# Patient Record
Sex: FEMALE | Race: BLACK OR AFRICAN AMERICAN | HISPANIC OR LATINO | Employment: FULL TIME
[De-identification: names, ages, dates, MRNs, and addresses within clinical notes are randomized per-mention and may not be internally consistent; named-entity substitution may affect disease eponyms.]

---

## 2023-07-19 ENCOUNTER — TRANSCRIBE ORDERS (OUTPATIENT)
Dept: OTHER | Age: 48
End: 2023-07-19

## 2023-07-19 DIAGNOSIS — R19.00 MASS OF PELVIS: Primary | ICD-10-CM

## 2023-07-21 ENCOUNTER — TELEPHONE (OUTPATIENT)
Dept: ORTHOPEDICS | Facility: CLINIC | Age: 48
End: 2023-07-21

## 2023-07-21 NOTE — TELEPHONE ENCOUNTER
Writer spoke with patient to gather more information about her imaging so we could collect it for triaging. She states she had a MRI done 7/17 of her pelvis at [facility] in [place] as well as a CT of chest and abdomen there as well on 7/19. She said she had another MRI done the week prior somewhere in [facility], but could not remember where that was done.    Aydee Umanzor LPN

## 2023-07-24 NOTE — TELEPHONE ENCOUNTER
"Action 2023 9:03 AM MT   Action Taken Sent a [facility] to [facility].  [facility]     Action 2023 8:46 AM MT   Action Taken Image disk states \"out for delivery\".     DIAGNOSIS:   Mass of pelvis [R19.00]  - Primary         APPOINTMENT DATE: 2023   NOTES STATUS DETAILS   OFFICE NOTE from referring provider Care Everywhere    OFFICE NOTE from other specialist Care Everywhere    DISCHARGE REPORT from the ER Care Everywhere    MEDICATION LIST Care Everywhere    MRI PACS  In process: Chris [facility]:  2023 - Pelvis  2023 - L Spine    Chris:  2017 - Pelvis   CT SCAN PACS [facility]:  2023 - Abd/Pel   XRAYS (IMAGES & REPORTS) In process: [facility]:  2023, 2023 - L Spine     "

## 2023-07-25 ENCOUNTER — VIRTUAL VISIT (OUTPATIENT)
Dept: ORTHOPEDICS | Facility: CLINIC | Age: 48
End: 2023-07-25
Payer: COMMERCIAL

## 2023-07-25 ENCOUNTER — PREP FOR PROCEDURE (OUTPATIENT)
Dept: ORTHOPEDICS | Facility: CLINIC | Age: 48
End: 2023-07-25

## 2023-07-25 ENCOUNTER — PRE VISIT (OUTPATIENT)
Dept: ORTHOPEDICS | Facility: CLINIC | Age: 48
End: 2023-07-25

## 2023-07-25 VITALS — HEIGHT: 68 IN | BODY MASS INDEX: 35.46 KG/M2 | WEIGHT: 234 LBS

## 2023-07-25 DIAGNOSIS — R19.00 MASS OF PELVIS: ICD-10-CM

## 2023-07-25 DIAGNOSIS — C79.51 METASTATIC BONE TUMOR (H): Primary | ICD-10-CM

## 2023-07-25 PROCEDURE — 99203 OFFICE O/P NEW LOW 30 MIN: CPT | Mod: VID | Performed by: ORTHOPAEDIC SURGERY

## 2023-07-25 ASSESSMENT — ENCOUNTER SYMPTOMS
DEPRESSION: 0
MEMORY LOSS: 0
DIZZINESS: 0
DISTURBANCES IN COORDINATION: 0
SPEECH CHANGE: 0
NUMBNESS: 1
HEADACHES: 0
LOSS OF CONSCIOUSNESS: 0
SEIZURES: 0
PANIC: 0
INSOMNIA: 1
NERVOUS/ANXIOUS: 1
WEAKNESS: 0
DECREASED CONCENTRATION: 0
TINGLING: 0
PARALYSIS: 0
TREMORS: 0

## 2023-07-25 ASSESSMENT — PAIN SCALES - GENERAL: PAINLEVEL: NO PAIN (0)

## 2023-07-25 NOTE — PATIENT INSTRUCTIONS
Diagnosis: Left pelvic malignancy.  Biopsy needed.  Suspect lymphoma    Plan: 1.  The patient has a walker which she will use.  2.  Miles to schedule the patient to be seen at noon on Thursday if possible to have a Lazaro-Cut biopsy performed in my clinic.  3.  We will tentatively schedule an open biopsy in the week or 2 to come in case the needle biopsy is not diagnostic.

## 2023-07-25 NOTE — LETTER
7/25/2023         RE: Rachael Johnson  [address] [place] [address]        Dear Colleague,    Thank you for referring your patient, Rachael Johnson, to the [facility] [place]. Please see a copy of my visit note below.    Virtual Visit Details      Diagnosis: Left pelvic malignancy.  Biopsy needed.  Suspect lymphoma    Plan: 1.  The patient has a walker which she will use.  2.  Miles to schedule the patient to be seen at noon on Thursday if possible to have a Lazaro-Cut biopsy performed in my clinic.  3.  We will tentatively schedule an open biopsy in the week or 2 to come in case the needle biopsy is not diagnostic.     Patient is a 48-year-old female who based on her reports in the electronic health record is otherwise healthy presents with left buttock pain and left sciatica.  Her evaluation by her primary care team is identified a large tumor in the left posterior ilium and sacrum.  In addition she has adenopathy in the peritoneum and along the left pelvic nodes.  She reports she has had discomfort for 2 months and despite massage and COVID practic treatments the pain is worsened.  She does have trouble walking.    I reviewed her outside MRI and CT scan.  In summary these recapitulate the findings described above.  Importantly the tumor does not involve the acetabulum.  But is highly suspicious for a malignancy perhaps of lymphoma.    I discussed the options of needle biopsy versus open surgery.  After that discussion we agreed to proceed with the needle biopsy this Thursday and an open biopsy to be scheduled if necessary.  All questions were answered.    This was an [facility] video visit.  It began at 1:38 PM and ended at 2:04 PM.  The patient was at her home.  The provider was in his office on the [facility].  The total length of the visit was 26 minutes.          Again, thank you for allowing me to participate in the care of your patient.         Sincerely,        Bethel Estrada MD

## 2023-07-25 NOTE — PROGRESS NOTES
Virtual Visit Details      Diagnosis: Left pelvic malignancy.  Biopsy needed.  Suspect lymphoma    Plan: 1.  The patient has a walker which she will use.  2.  Miles to schedule the patient to be seen at noon on Thursday if possible to have a Lazaro-Cut biopsy performed in my clinic.  3.  We will tentatively schedule an open biopsy in the week or 2 to come in case the needle biopsy is not diagnostic.     Patient is a 48-year-old female who based on her reports in the electronic health record is otherwise healthy presents with left buttock pain and left sciatica.  Her evaluation by her primary care team is identified a large tumor in the left posterior ilium and sacrum.  In addition she has adenopathy in the peritoneum and along the left pelvic nodes.  She reports she has had discomfort for 2 months and despite massage and COVID practic treatments the pain is worsened.  She does have trouble walking.    I reviewed her outside MRI and CT scan.  In summary these recapitulate the findings described above.  Importantly the tumor does not involve the acetabulum.  But is highly suspicious for a malignancy perhaps of lymphoma.    I discussed the options of needle biopsy versus open surgery.  After that discussion we agreed to proceed with the needle biopsy this Thursday and an open biopsy to be scheduled if necessary.  All questions were answered.    This was an [facility] video visit.  It began at 1:38 PM and ended at 2:04 PM.  The patient was at her home.  The provider was in his office on the [facility].  The total length of the visit was 26 minutes.

## 2023-07-25 NOTE — NURSING NOTE
Is the patient currently in the state of [place]? YES    Visit mode:VIDEO    If the visit is dropped, the patient can be reconnected by: VIDEO VISIT: Text to cell phone: [phone]    Will anyone else be joining the visit? NO      How would you like to obtain your AVS? MyChart    Are changes needed to the allergy or medication list? NO    Reason for visit: Consult    Edwige LU

## 2023-07-26 ENCOUNTER — TELEPHONE (OUTPATIENT)
Dept: ORTHOPEDICS | Facility: CLINIC | Age: 48
End: 2023-07-26
Payer: COMMERCIAL

## 2023-07-26 PROBLEM — C79.51 METASTATIC BONE TUMOR (H): Status: ACTIVE | Noted: 2023-07-25

## 2023-07-26 RX ORDER — CEFAZOLIN SODIUM 2 G/50ML
2 SOLUTION INTRAVENOUS
Status: CANCELLED | OUTPATIENT
Start: 2023-07-26

## 2023-07-26 RX ORDER — CEFAZOLIN SODIUM 2 G/50ML
2 SOLUTION INTRAVENOUS SEE ADMIN INSTRUCTIONS
Status: CANCELLED | OUTPATIENT
Start: 2023-07-26

## 2023-07-26 NOTE — TELEPHONE ENCOUNTER
Patient is scheduled for surgery with Dr. Estrada    Spoke with: Rachael    Date of Surgery: 8/11/23    Location: [facility]    Informed patient they will need an adult  Yes    Pre op with Provider PAC, 8/04/23 at 11AM    Additional imaging/appointments: POP Made    Surgery packet: Received     Additional comments: Attentively scheduled, per Dr. Estrada.         Karyn De Luna on 7/26/2023 at 12:32 PM

## 2023-07-26 NOTE — TELEPHONE ENCOUNTER
FUTURE VISIT INFORMATION      SURGERY INFORMATION:  Date: 23  [facility]  Surgeon:  Bethel Estrada MD   Anesthesia Type:  general  Procedure: biopsy left pelvic tumor   Consult: virtual visit 23    RECORDS REQUESTED FROM:       Primary Care Provider: Jens    Most recent EKG+ Tracin22- [facility]

## 2023-07-27 ENCOUNTER — OFFICE VISIT (OUTPATIENT)
Dept: ORTHOPEDICS | Facility: CLINIC | Age: 48
End: 2023-07-27
Payer: COMMERCIAL

## 2023-07-27 ENCOUNTER — LAB (OUTPATIENT)
Dept: LAB | Facility: CLINIC | Age: 48
End: 2023-07-27
Payer: COMMERCIAL

## 2023-07-27 DIAGNOSIS — C79.51 METASTATIC BONE TUMOR (H): ICD-10-CM

## 2023-07-27 DIAGNOSIS — C79.51 METASTATIC BONE TUMOR (H): Primary | ICD-10-CM

## 2023-07-27 DIAGNOSIS — M85.50 ANEURYSMAL BONE CYST: ICD-10-CM

## 2023-07-27 LAB
BASOPHILS # BLD AUTO: 0 10E3/UL (ref 0–0.2)
BASOPHILS NFR BLD AUTO: 1 %
CRP SERPL-MCNC: 14.2 MG/L
EOSINOPHIL # BLD AUTO: 0.1 10E3/UL (ref 0–0.7)
EOSINOPHIL NFR BLD AUTO: 1 %
ERYTHROCYTE [DISTWIDTH] IN BLOOD BY AUTOMATED COUNT: 14.1 % (ref 10–15)
ERYTHROCYTE [SEDIMENTATION RATE] IN BLOOD BY WESTERGREN METHOD: 37 MM/HR (ref 0–30)
HCT VFR BLD AUTO: 38.5 % (ref 35–47)
HGB BLD-MCNC: 12 G/DL (ref 11.7–15.7)
IMM GRANULOCYTES # BLD: 0 10E3/UL
IMM GRANULOCYTES NFR BLD: 0 %
LYMPHOCYTES # BLD AUTO: 1.9 10E3/UL (ref 0.8–5.3)
LYMPHOCYTES NFR BLD AUTO: 30 %
MCH RBC QN AUTO: 28.2 PG (ref 26.5–33)
MCHC RBC AUTO-ENTMCNC: 31.2 G/DL (ref 31.5–36.5)
MCV RBC AUTO: 91 FL (ref 78–100)
MONOCYTES # BLD AUTO: 0.5 10E3/UL (ref 0–1.3)
MONOCYTES NFR BLD AUTO: 7 %
NEUTROPHILS # BLD AUTO: 4 10E3/UL (ref 1.6–8.3)
NEUTROPHILS NFR BLD AUTO: 61 %
NRBC # BLD AUTO: 0 10E3/UL
NRBC BLD AUTO-RTO: 0 /100
PLATELET # BLD AUTO: 387 10E3/UL (ref 150–450)
RBC # BLD AUTO: 4.25 10E6/UL (ref 3.8–5.2)
TOTAL PROTEIN SERUM FOR ELP: 7.3 G/DL (ref 6.4–8.3)
WBC # BLD AUTO: 6.5 10E3/UL (ref 4–11)

## 2023-07-27 PROCEDURE — 99000 SPECIMEN HANDLING OFFICE-LAB: CPT | Performed by: PATHOLOGY

## 2023-07-27 PROCEDURE — 88271 CYTOGENETICS DNA PROBE: CPT | Performed by: ORTHOPAEDIC SURGERY

## 2023-07-27 PROCEDURE — 86140 C-REACTIVE PROTEIN: CPT | Performed by: PATHOLOGY

## 2023-07-27 PROCEDURE — 20206 BIOPSY MUSCLE PERQ NEEDLE: CPT | Mod: LT | Performed by: ORTHOPAEDIC SURGERY

## 2023-07-27 PROCEDURE — 88291 CYTO/MOLECULAR REPORT: CPT | Performed by: MEDICAL GENETICS

## 2023-07-27 PROCEDURE — 36415 COLL VENOUS BLD VENIPUNCTURE: CPT | Performed by: PATHOLOGY

## 2023-07-27 PROCEDURE — 84166 PROTEIN E-PHORESIS/URINE/CSF: CPT | Performed by: PATHOLOGY

## 2023-07-27 PROCEDURE — 88342 IMHCHEM/IMCYTCHM 1ST ANTB: CPT | Mod: 26 | Performed by: STUDENT IN AN ORGANIZED HEALTH CARE EDUCATION/TRAINING PROGRAM

## 2023-07-27 PROCEDURE — 88365 INSITU HYBRIDIZATION (FISH): CPT | Mod: 26 | Performed by: STUDENT IN AN ORGANIZED HEALTH CARE EDUCATION/TRAINING PROGRAM

## 2023-07-27 PROCEDURE — 85025 COMPLETE CBC W/AUTO DIFF WBC: CPT | Performed by: PATHOLOGY

## 2023-07-27 PROCEDURE — 84166 PROTEIN E-PHORESIS/URINE/CSF: CPT | Mod: 26

## 2023-07-27 PROCEDURE — 85652 RBC SED RATE AUTOMATED: CPT | Performed by: PATHOLOGY

## 2023-07-27 PROCEDURE — 83521 IG LIGHT CHAINS FREE EACH: CPT | Performed by: ORTHOPAEDIC SURGERY

## 2023-07-27 PROCEDURE — 84165 PROTEIN E-PHORESIS SERUM: CPT | Mod: TC | Performed by: PATHOLOGY

## 2023-07-27 PROCEDURE — 88341 IMHCHEM/IMCYTCHM EA ADD ANTB: CPT | Mod: 26 | Performed by: STUDENT IN AN ORGANIZED HEALTH CARE EDUCATION/TRAINING PROGRAM

## 2023-07-27 PROCEDURE — 84165 PROTEIN E-PHORESIS SERUM: CPT | Mod: 26

## 2023-07-27 PROCEDURE — 88365 INSITU HYBRIDIZATION (FISH): CPT | Mod: TC | Performed by: ORTHOPAEDIC SURGERY

## 2023-07-27 PROCEDURE — 88307 TISSUE EXAM BY PATHOLOGIST: CPT | Mod: 26 | Performed by: STUDENT IN AN ORGANIZED HEALTH CARE EDUCATION/TRAINING PROGRAM

## 2023-07-27 PROCEDURE — 84155 ASSAY OF PROTEIN SERUM: CPT | Performed by: ORTHOPAEDIC SURGERY

## 2023-07-27 PROCEDURE — 88360 TUMOR IMMUNOHISTOCHEM/MANUAL: CPT | Mod: 26 | Performed by: STUDENT IN AN ORGANIZED HEALTH CARE EDUCATION/TRAINING PROGRAM

## 2023-07-27 NOTE — PROGRESS NOTES
Patient is scheduled for surgical biopsy August 11th pending in clinic biopsy results. Surgical packet and soap received in clinic.    LUIS signed and sent to be scanned into chart.    Tara Holter, RNCC

## 2023-07-27 NOTE — NURSING NOTE
Chief Complaint   Patient presents with    RECHECK     In-clinic biopsy        48 year old  1975       Pain Assessment  Patient Currently in Pain: Yes  0-10 Pain Scale: 1  Primary Pain Location: Buttocks (left)             Data Unavailable        No Known Allergies        No current outpatient medications on file.     No current facility-administered medications for this visit.

## 2023-07-27 NOTE — PATIENT INSTRUCTIONS
Plan: 1.  We will call the patient with the results and set up next steps.  2.  We will order CBC with platelets sed rate CRP and myeloma screening test today.

## 2023-07-27 NOTE — LETTER
7/27/2023         RE: Rachael Johnson  [address] [place] [address]        Dear Colleague,    Thank you for referring your patient, Rachael Johnson, to the [facility] [place]. Please see a copy of my visit note below.        Rachael is seen today in follow-up to her visit earlier this week.  The hope today was to obtain a Lazaro-Cut biopsy of the left iliac and sacral tumor.    Risk and benefits were reviewed.  Consent was signed.    Procedure note: After informed consent and surgical timeout the left iliac and sacral region was prepped.  The skin was infiltrated with lidocaine.  An 11 blade was used to make a small opening in the skin.  3 passes with a Lazaro-Cut needle were performed.  1 did cause some bleeding.  The other 2 generated good samples.  Pressure was applied to the wound and a sterile bandage was applied after the bleeding was stopped.    Plan: 1.  We will call the patient with the results and set up next steps.  2.  We will order CBC with platelets sed rate CRP and myeloma screening test today.    Patient is scheduled for surgical biopsy August 11th pending in clinic biopsy results. Surgical packet and soap received in clinic.    LUIS signed and sent to be scanned into chart.    Tara Holter, RNCC Denis Regis Clohisy, MD

## 2023-07-27 NOTE — NURSING NOTE
[facility] [address] [address]  [phone]  Dept: [phone]  ______________________________________________________________________________    Patient: Rachael Johnson   : 1975   MRN: [id number]   2023    INVASIVE PROCEDURE SAFETY CHECKLIST    Date: 2023   Procedure:Left lower back biopsy   Patient Name: Rachael Johnson  MRN: [id number]  YOB: 1975    Action: Complete sections as appropriate. Any discrepancy results in a HARD COPY until resolved.     PRE PROCEDURE:  Patient ID verified with 2 identifiers (name and  or MRN): Yes  Procedure and site verified with patient/designee (when able): Yes  Accurate consent documentation in medical record: Yes  H&P (or appropriate assessment) documented in medical record: NA  H&P must be up to 20 days prior to procedure and updates within 24 hours of procedure as applicable: NA  Relevant diagnostic and radiology test results appropriately labeled and displayed as applicable: Yes  Procedure site(s) marked with provider initials: NA    TIMEOUT:  Time-Out performed immediately prior to starting procedure, including verbal and active participation of all team members addressing the following:Yes  * Correct patient identify  * Confirmed that the correct side and site are marked  * An accurate procedure consent form  * Agreement on the procedure to be done  * Correct patient position  * Relevant images and results are properly labeled and appropriately displayed  * The need to administer antibiotics or fluids for irrigation purposes during the procedure as applicable   * Safety precautions based on patient history or medication use    DURING PROCEDURE: Verification of correct person, site, and procedures any time the responsibility for care of the patient is transferred to another member of the care team.       The following medications were given:         Prior  to injection, verified patient identity using patient's name and date of birth.  Due to injection administration, patient instructed to remain in clinic for 15 minutes  afterwards, and to report any adverse reaction to me immediately.    Lidocaine Injection for biopsy   Medication Name: Lidocaine NDC 06972-379-64  Drug Amount Wasted:  Yes: 10 mg/ml   Vial/Syringe: Syringe  Expiration Date:  1/1/25  Lot: [id number]        Scribed by Chelsey Victoria ATC for Dr. Estrada on July 27, 2023 at 12:00 pm based on the provider's statements to me.     Chelsey Victorai ATC

## 2023-07-27 NOTE — PROGRESS NOTES
Rachael is seen today in follow-up to her visit earlier this week.  The hope today was to obtain a Lazaro-Cut biopsy of the left iliac and sacral tumor.    Risk and benefits were reviewed.  Consent was signed.    Procedure note: After informed consent and surgical timeout the left iliac and sacral region was prepped.  The skin was infiltrated with lidocaine.  An 11 blade was used to make a small opening in the skin.  3 passes with a Lazaro-Cut needle were performed.  1 did cause some bleeding.  The other 2 generated good samples.  Pressure was applied to the wound and a sterile bandage was applied after the bleeding was stopped.    Plan: 1.  We will call the patient with the results and set up next steps.  2.  We will order CBC with platelets sed rate CRP and myeloma screening test today.

## 2023-07-28 LAB
ALBUMIN SERPL ELPH-MCNC: 3.8 G/DL (ref 3.7–5.1)
ALPHA1 GLOB SERPL ELPH-MCNC: 0.4 G/DL (ref 0.2–0.4)
ALPHA2 GLOB SERPL ELPH-MCNC: 1 G/DL (ref 0.5–0.9)
B-GLOBULIN SERPL ELPH-MCNC: 0.8 G/DL (ref 0.6–1)
GAMMA GLOB SERPL ELPH-MCNC: 1.2 G/DL (ref 0.7–1.6)
KAPPA LC FREE SER-MCNC: 2.53 MG/DL (ref 0.33–1.94)
KAPPA LC FREE/LAMBDA FREE SER NEPH: 0.99 {RATIO} (ref 0.26–1.65)
LAMBDA LC FREE SERPL-MCNC: 2.55 MG/DL (ref 0.57–2.63)
M PROTEIN SERPL ELPH-MCNC: 0 G/DL
PROT PATTERN SERPL ELPH-IMP: ABNORMAL
PROT PATTERN UR ELPH-IMP: NORMAL

## 2023-08-01 ENCOUNTER — TELEPHONE (OUTPATIENT)
Dept: SURGERY | Facility: CLINIC | Age: 48
End: 2023-08-01
Payer: COMMERCIAL

## 2023-08-01 NOTE — TELEPHONE ENCOUNTER
[facility] Call Center    Phone Message    May a detailed message be left on voicemail: yes     Reason for Call: Other: Pt is requesting a call back to discuss questions in regards to their appt. Pt would not specify the questions at hand.     Action Taken: Message routed to:  [facility]    Travel Screening: Not Applicable

## 2023-08-02 ENCOUNTER — PREP FOR PROCEDURE (OUTPATIENT)
Dept: ORTHOPEDICS | Facility: CLINIC | Age: 48
End: 2023-08-02
Payer: COMMERCIAL

## 2023-08-02 NOTE — TELEPHONE ENCOUNTER
Attempted to leave a voice mail but the voice mail box was full.  Will send a my chart message.  Odalys Holt RN

## 2023-08-03 ENCOUNTER — TELEPHONE (OUTPATIENT)
Dept: ORTHOPEDICS | Facility: CLINIC | Age: 48
End: 2023-08-03
Payer: COMMERCIAL

## 2023-08-03 NOTE — CONFIDENTIAL NOTE
Call placed to patient. Per Dr. Estrada tissue obtained from biopsy in clinic biopsy is adequate. Cancel surgery and PAC appointment. Dr. Estrada will call patient tomorrow with results and next steps. Patient verbalized understanding and had no further questions.     Tara Holter, RNCC

## 2023-08-04 ENCOUNTER — PATIENT OUTREACH (OUTPATIENT)
Dept: ONCOLOGY | Facility: CLINIC | Age: 48
End: 2023-08-04

## 2023-08-04 ENCOUNTER — TELEPHONE (OUTPATIENT)
Dept: ORTHOPEDICS | Facility: CLINIC | Age: 48
End: 2023-08-04

## 2023-08-04 ENCOUNTER — PRE VISIT (OUTPATIENT)
Dept: SURGERY | Facility: CLINIC | Age: 48
End: 2023-08-04

## 2023-08-04 DIAGNOSIS — C85.90 LYMPHOMA (H): Primary | ICD-10-CM

## 2023-08-04 NOTE — PROGRESS NOTES
Clohisy, Denis Regis, MD Holter, Tara, RN  I spoke with Dr. Salcedo this morning.    Please:  1. Cancel her surgery.  2. Place a medical oncology consult to treat lymphoma  3. See if med onc wants a PET CT as she has had a body CT already.  Thank you,  Bethel

## 2023-08-04 NOTE — PROGRESS NOTES
[facility]: Cancer Care                                                                   Hem/Onc  Referral reviewed     ASSESSMENT      Clinical History (per Nurse review of records provided):    48 year old female patient with     [address] [place] [address]    Records Location: [facility]   Pertinent labs -- BOOKMARKED  Pertinent pathology report(s) -- BOOKMARKED  Pertinent imaging -- BOOKMARKED  Referring provider note(s)-- BOOKMARKED    INTERVENTION(S)                                                      -OUTGOING CALL to pt:   Introduced my role as nurse navigator with [facility] Hematology/Oncology dept and that we have recd the referral for dx of lymphoma from Dr Oneil.  Identity verified. Pt confirms they are aware of the referral and ready to schedule  Pt is able to do video consult if necessary / recommended  Preferred [facility] Hem/Onc clinic location: [facility]  Might need [facility] depending on time of appt. Lives ~ 3 hrs away from [facility]  Occupation : Massage therapist  From [place]  She will come to appt alone  Barriers to Care Identified if applicable:  Explained to pt I will ask MD to advise re: Lab/PET orders  We discussed that there are many types of lymphoma and in addition to bx a complete work up usually includes lab, imaging, sometimes BMBx in order to characterize the type and stage of lymphoma and guide tx.  Pt voiced understanding of above instructions and information and denied further questions and was   warm-transferred to Oncology Patient Access rep to schedule the consultation.    -Referral Triage Scheduling Instructions added to Hem/Onc  referral for Patient Access rep    PLAN                                                      Hem/Onc consult     Records Needed:     Additional testing needed prior to consult:     See records team's Pre-Visit encounter documentation for additional records retrieval information.    Jie Perry, RN, BSN, OCN  Hematology/Oncology  New Patient Nurse Navigator   [facility]  [phone]

## 2023-08-04 NOTE — CONFIDENTIAL NOTE
Call placed to patient. Confirmed patient had spoke with Dr. Salcedo regarding her diagnosis. Informed her referral for medical oncology placed and someone will be reaching out to schedule an appointment. Patient would like to be seen through [facility]. Briefly discussed Critical access hospital as a resource for future appointments since patient lives in [place]. Patient did not have any further questions.    Tara Holter, RNCC

## 2023-08-04 NOTE — TELEPHONE ENCOUNTER
I spoke with Dr. Salcedo.  We can call the patient to schedule further testing and appointments.  She will need a referral to medical oncology to treat lymphoma.  Her upcoming surgery should be canceled.    We will contact medical oncology team to see if they would like PET/CT in addition to her current and recent CT for their evaluation.    ----- Message from Tara Holter, RN sent at 8/1/2023  2:05 PM CDT -----  Regarding: Biopsy Results  [facility] Dr. Shiela Cano is requesting her results be communicated to her chiropractor, Dr. Gail Salcedo, who will then relay the results to the patient. She signed an LUIS when she was in clinic.     Dr. Gail Salcedo   [phone]

## 2023-08-06 DIAGNOSIS — Z11.59 ENCOUNTER FOR SCREENING FOR VIRAL DISEASE: ICD-10-CM

## 2023-08-06 DIAGNOSIS — C83.398 DIFFUSE LARGE B-CELL LYMPHOMA OF EXTRANODAL SITE EXCLUDING SPLEEN AND OTHER SOLID ORGANS: Primary | ICD-10-CM

## 2023-08-07 NOTE — TELEPHONE ENCOUNTER
RECORDS STATUS - ALL OTHER DIAGNOSIS    Lymphoma (H)   RECORDS RECEIVED FROM:    Appt Date: 8/11/2023 with Dr. Caicedo    NOTES STATUS DETAILS   OFFICE NOTE from referring provider Complete [facility]     Bethel Estr[place] MD      DISCHARGE SUMMARY from hospital     DISCHARGE REPORT from the ER     OPERATIVE REPORT Complete See internal biopsy in [facility]   CLINICAL TRIAL TREATMENTS TO DATE     LABS     PATHOLOGY REPORTS Complete Internal biopsy in [facility]  Soft tissue mass, perisacral, needle core biopsy:  - Diffuse large B-cell lymphoma, germinal center B-cell subtype  - Negative for EBV by in situ hybridization  - See comment   ANYTHING RELATED TO DIAGNOSIS Complete Labs last updated on 8/6/2023   GENONOMIC TESTING     TYPE:     IMAGING (NEED IMAGES & REPORT)     CT SCANS Complete - [facility] images are in PACS CT Chest Abdomen Pelvis 7/20/2023   Xray Chest Complete 11/22/2022 more in PACS   MRI Complete  MRI Pelvis 7/18/2023    MRI Lumbar Spine 7/14/2023    MAMMO     Xray Lumbar Spine Complete 6/22/2023 more in PACS   ULTRASOUND     PET

## 2023-08-09 NOTE — PROGRESS NOTES
[facility]               NEW PATIENT REFERRAL        Aug 11, 2023   Subjective   REFERRAL SOURCE: Bethel Estrada MD    REASON FOR VISIT: New DLBCL    HISTORY OF PRESENT ILLNESS:  Ms. Rachael Johnson is a 48 year old female who presents for consultation regarding newly diagnosed DLBCL.     She originally developed left lower back pain radiated down the left leg with associated numbness/tingling in May 2023. Was managed conservatively with PT and pain medications without improvement. She eventually had a MRI 7/14/23 which revealed a 9 x 10 x 11 cm mass involving left sacrum and left iliac bone and soft tissues, with encasement of left S1 nerve root. CT showed additional soft tissue nodules in left flank and left abdomen and left external iliac LAD. She saw Ortho and biopsy of the soft tissue mass on 7/27/23 shows DLBCL (GCB subtype) with Ki-67 of 70%. FISH negative for MYC/BCL2/BCL6 rearrangements.     Her back pain the past couple of months has been getting worse. Pain is most noticeable when sitting and sleeping and she has had progressive difficulty walking. Sleeping has been tolerable only on her L side and has significantly affected her quality of sleep. Initially was taking a lot of pain medications along with needing toradol shots in the ED but lately she's been trying to manage it with occasional ibuprofen. She has been having a burning sensation, stinging, shooting pain and numbness that travels down the entirety of her left leg. She thinks she feels the mass when sitting. She denies having any fevers or night sweats. She has noticed that she lost 10 lbs in the past month, without any changes in her appetite or diet.        ONCOLOGIC SUMMARY:  Diagnosis:  Diffuse large B-cell lymphoma dx 7/2023, presenting with left back pain and radiculopathy 2/2 large 9 x 10 x 11 cm sacral mass encasing lumbosacral nerve roots. Germinal-center subtype, FISH negative for  "MYC/BCL2/BCL6 rearrangements, Ki67 70%. Stage IV with involvement of bone, pelvic nodules, and lymphadenopathy above/below diaphragm. . R-IPI 3 (LDH, stage, extranodal sites).     Intent of treatment: Curative    Treatment history:  Mick-R-CHP pending    PAST MEDICAL HISTORY:  Ovarian cyst  Reported history of spina bifada    FAMILY HISTORY:  No cancer in family history.  Maternal grandmother passed away at age 33 possibly due to pregnancy/delivery complications    SOCIAL HISTORY:  Never smoker.   Lives in [place], [place] by herself.  after  passed away from kidney failure. Originally from [place]. No family nearby, sister lives in [facility].   Works as a massage therapist.       Allergies   Allergen Reactions    Seasonal Allergies Other (See Comments)     Itchy, watery eyes, runny nose and congestion   Itchy, watery eyes, runny nose and congestion       Current Outpatient Medications:     cholecalciferol (VITAMIN D3) 25 mcg (1000 units) capsule, Take 1,000 Units by mouth, Disp: , Rfl:     loratadine (CLARITIN) 10 MG tablet, Take 10 mg by mouth, Disp: , Rfl:     ZINC METHIONATE PO, Take  by mouth., Disp: , Rfl:     zinc sulfate (ZINCATE) 220 (50 Zn) MG capsule, by mouth., Disp: , Rfl:   No current facility-administered medications for this visit.     REVIEW OF SYSTEMS:  12-point ROS reviewed and negative other than that mentioned in HPI.     Objective   VITAL SIGNS:  BP (!) 163/95 (BP Location: Right arm, Patient Position: Sitting, Cuff Size: Adult Regular)   Pulse 100   Temp 98.4  F (36.9  C) (Oral)   Ht 1.725 m (5' 7.91\")   Wt 102 kg (224 lb 14.4 oz)   SpO2 98%   BMI 34.28 kg/m      ECOG PS: 1     PHYSICAL EXAM:  General: Awake, alert, in no acute distress. Oriented x 3.  HEENT: Normocephalic, atraumatic. No scleral icterus.   Lymph: No cervical, supraclavicular, or axillary lymphadenopathy appreciated.   CV: Regular rate and rhythm. No murmurs, rubs, or gallops appreciated.  Resp: Good " inspiratory effort, lungs clear to auscultation bilaterally.  GI: Abdomen soft, nontender, nondistended.   Back: Pain to palpation along lower left back/flank.   Ext: No peripheral edema bilaterally.  Neuro: CN II-XII grossly intact. 3/5 L leg extension 5/5 R leg extension. Minimal elevation of L leg against gravity.  Skin: No rash, unusual bruising or prominent lesions.  Psych: Pleasant, normal affect.    LABS:  I reviewed the following labs:  Lab Results   Component Value Date    WBC 7.3 08/10/2023    HGB 11.9 08/10/2023    HCT 36.8 08/10/2023    MCV 88 08/10/2023     08/10/2023     Lab Results   Component Value Date     08/10/2023    POTASSIUM 3.5 08/10/2023    CR 0.83 08/10/2023    BUN 16.8 08/10/2023    CHLORIDE 106 08/10/2023    MONTEZ 9.9 08/10/2023    GLC 78 2023      Lab Results   Component Value Date    ALT 11 08/10/2023    AST 23 08/10/2023    ALKPHOS 106 (H) 08/10/2023    BILITOTAL 0.3 08/10/2023      Lab Results   Component Value Date     (H) 08/10/2023    URIC 5.8 (H) 08/10/2023       IMAGIN23 MRI pelvis:  1. Large enhancing mass (9 x 10 x 11 cm) centered about the left iliac bone and left sacrum, consistent with neoplasm. Recommend clinical correlation for metastatic disease as well as CT chest/abdomen/pelvis for further evaluation.   2. Possible peritoneal nodules as well as enlarged left pelvic lymph nodes, suspicious for metastatic disease.     23 PET:  1. Intense hypermetabolic FDG uptake in destructive soft tissue mass  involving the left iliac bone and sacrum (biopsy-proven diffuse large  B-cell lymphoma). The mass extends into the sacral neural foramina.  Deauville score: 5.  2. Multifocal areas of heterogenous hypermetabolic FDG uptake  throughout spine, right iliac bone and right acetabulum, likely  lymphomatous involvement.  3. Intensely hypermetabolic right hilar, left common, external iliac  and bilateral inguinal nodes likely represent  lymphomatous  involvement.  4. Lymphomatous soft tissue implants in the left retroperitoneum and  right paraspinal region at T9 level.    PATHOLOGY:  7/27/23 Sacral mass biopsy:  Soft tissue mass, perisacral, needle core biopsy:  - Diffuse large B-cell lymphoma, germinal center B-cell subtype  - Negative for EBV by in situ hybridization  - See comment    This specimen is consistent with involvement by a large B cell lymphoma. The lymphoma cells are of germinal center B-cell origin according to the Julien algorithm. MYC and BCL2 are negative which denote a non-double expressor status. There is no evidence of a low-grade lymphoma in this small biopsy.     The differential diagnosis includes diffuse large B-cell lymphoma, NOS and large B-cell lymphoma with MYC and BCL2 and/or BCL6 rearrangements. FISH testing for MYC, BCL2 and BCL6 translocations is pending and the results will be reported separately and described in an addendum to this report.    FISH:  RESULTS:     - Gain of IGH (cannot rule out rearrangement) (19.5%)  - No BCL6 rearrangement  - No MYC rearrangement or IGH::MYC fusion  - No BCL2 rearrangement    Assessment & Plan   #Stage IV bulky DLBCL (GCB subtype)   #Back pain/radiculopathy 2/2 large sacral mass  Pleasant 49 yo F with newly diagnosed DLBCL, stage IV with involvement of multiple bone sites including dominant 9 x 10 x 11 cm sacral mass, pelvic nodules, and lymphadenopathy above/below diaphragm. GCB subtype, FISH negative for rearrangements. R-IPI high-intermediate risk (3). She is quite symptomatic with back pain and sciatica due to encasement of lumbosacral nerves by the mass. Will complete additional staging with lumbar puncture to rule out CNS involvement given the peripheral nerve involvement.     We reviewed the overall diagnosis and prognosis of DLBCL. I recommend first-line therapy with polatuzumab-R-CHP which was recently FDA approved based on the Phase 3 international POLARIX trial  (https://www.nejm.org/doi/full/10.1056/SQZYnd7270807). The primary endpoint of progression, relapse, or death was significantly lower in the Mick-R-CHP group than in the R-CHOP group with a hazard ratio of 0.73, and 2-year progression-free survival was 76.7% in the Mick-R-CHP group compared to 70.2% in the standard R-CHOP group. Toxicity profile was also similar between the two regimens. She qualifies with a R-IPI of 3.      We reviewed the logistics and schedule of mick-R-CHP (every 3 weeks, all done as an outpatient in the infusion center). We reviewed potential side effects including possible Rituximab infusion reaction the first cycle, cytopenias/infection, fatigue, GI symptoms, neuropathy, and low chance of anthracycline cardiotoxicity. She is agreeable to proceed. We will plan for a total of 6 cycles with restaging PET-CT every 2 cycles. We also discussed that there may be a role for consolidative radiation given the bulky sacral mass. We will plan to start the first cycle at the [facility] while we look for alternative areas that are closer to her home. If that is not possible, she is agreeable to doing her infusions here. May need [facility] for some stays depending on how early the appointments are. Since it may take a few weeks to get an infusion appt, will start prednisone now to help with pain/symptoms.     #Ppx  - TLS ppx: baseline uric acid 5.8. Start allopurinol 300 mg daily. Encouraged good hydration.  - ID ppx: start  mg BID.      PLAN:  - Start prednisone 100 mg daily x 5 days  - Staging lumbar puncture  - Infusion appt for mick-R-CHP ASAP    Patient was seen with medical student Aj Auguste.     Total of 70 minutes on patient visit, reviewing records, interpreting test results, placing orders, and documentation on the day of service.    Kalpana Caicedo MD  Attending Physician, [facility]

## 2023-08-10 ENCOUNTER — LAB (OUTPATIENT)
Dept: LAB | Facility: CLINIC | Age: 48
End: 2023-08-10
Attending: INTERNAL MEDICINE
Payer: COMMERCIAL

## 2023-08-10 DIAGNOSIS — C83.398 DIFFUSE LARGE B-CELL LYMPHOMA OF EXTRANODAL SITE EXCLUDING SPLEEN AND OTHER SOLID ORGANS: ICD-10-CM

## 2023-08-10 DIAGNOSIS — Z11.59 ENCOUNTER FOR SCREENING FOR VIRAL DISEASE: ICD-10-CM

## 2023-08-10 LAB
ALBUMIN SERPL BCG-MCNC: 4.1 G/DL (ref 3.5–5.2)
ALP SERPL-CCNC: 106 U/L (ref 35–104)
ALT SERPL W P-5'-P-CCNC: 11 U/L (ref 0–50)
ANION GAP SERPL CALCULATED.3IONS-SCNC: 14 MMOL/L (ref 7–15)
AST SERPL W P-5'-P-CCNC: 23 U/L (ref 0–45)
BASOPHILS # BLD AUTO: 0 10E3/UL (ref 0–0.2)
BASOPHILS NFR BLD AUTO: 0 %
BILIRUB SERPL-MCNC: 0.3 MG/DL
BUN SERPL-MCNC: 16.8 MG/DL (ref 6–20)
CALCIUM SERPL-MCNC: 9.9 MG/DL (ref 8.6–10)
CHLORIDE SERPL-SCNC: 106 MMOL/L (ref 98–107)
CREAT SERPL-MCNC: 0.83 MG/DL (ref 0.51–0.95)
DEPRECATED HCO3 PLAS-SCNC: 24 MMOL/L (ref 22–29)
EOSINOPHIL # BLD AUTO: 0.1 10E3/UL (ref 0–0.7)
EOSINOPHIL NFR BLD AUTO: 1 %
ERYTHROCYTE [DISTWIDTH] IN BLOOD BY AUTOMATED COUNT: 13.9 % (ref 10–15)
GFR SERPL CREATININE-BSD FRML MDRD: 85 ML/MIN/1.73M2
GLUCOSE SERPL-MCNC: 78 MG/DL (ref 70–99)
HCT VFR BLD AUTO: 36.8 % (ref 35–47)
HGB BLD-MCNC: 11.9 G/DL (ref 11.7–15.7)
HIV 1+2 AB+HIV1 P24 AG SERPL QL IA: NONREACTIVE
IMM GRANULOCYTES # BLD: 0 10E3/UL
IMM GRANULOCYTES NFR BLD: 0 %
INTERPRETATION: NORMAL
LDH SERPL L TO P-CCNC: 473 U/L (ref 0–250)
LYMPHOCYTES # BLD AUTO: 2 10E3/UL (ref 0.8–5.3)
LYMPHOCYTES NFR BLD AUTO: 27 %
MCH RBC QN AUTO: 28.5 PG (ref 26.5–33)
MCHC RBC AUTO-ENTMCNC: 32.3 G/DL (ref 31.5–36.5)
MCV RBC AUTO: 88 FL (ref 78–100)
MONOCYTES # BLD AUTO: 0.5 10E3/UL (ref 0–1.3)
MONOCYTES NFR BLD AUTO: 6 %
NEUTROPHILS # BLD AUTO: 4.8 10E3/UL (ref 1.6–8.3)
NEUTROPHILS NFR BLD AUTO: 66 %
NRBC # BLD AUTO: 0 10E3/UL
NRBC BLD AUTO-RTO: 0 /100
PHOSPHATE SERPL-MCNC: 3.7 MG/DL (ref 2.5–4.5)
PLATELET # BLD AUTO: 385 10E3/UL (ref 150–450)
POTASSIUM SERPL-SCNC: 3.5 MMOL/L (ref 3.4–5.3)
PROT SERPL-MCNC: 7.4 G/DL (ref 6.4–8.3)
RBC # BLD AUTO: 4.17 10E6/UL (ref 3.8–5.2)
SODIUM SERPL-SCNC: 144 MMOL/L (ref 136–145)
URATE SERPL-MCNC: 5.8 MG/DL (ref 2.4–5.7)
WBC # BLD AUTO: 7.3 10E3/UL (ref 4–11)

## 2023-08-10 PROCEDURE — 36415 COLL VENOUS BLD VENIPUNCTURE: CPT

## 2023-08-10 PROCEDURE — 84100 ASSAY OF PHOSPHORUS: CPT

## 2023-08-10 PROCEDURE — 85025 COMPLETE CBC W/AUTO DIFF WBC: CPT

## 2023-08-10 PROCEDURE — 87340 HEPATITIS B SURFACE AG IA: CPT

## 2023-08-10 PROCEDURE — 84550 ASSAY OF BLOOD/URIC ACID: CPT

## 2023-08-10 PROCEDURE — 80053 COMPREHEN METABOLIC PANEL: CPT

## 2023-08-10 PROCEDURE — 86704 HEP B CORE ANTIBODY TOTAL: CPT

## 2023-08-10 PROCEDURE — 83615 LACTATE (LD) (LDH) ENZYME: CPT

## 2023-08-10 PROCEDURE — 86803 HEPATITIS C AB TEST: CPT

## 2023-08-10 PROCEDURE — 87389 HIV-1 AG W/HIV-1&-2 AB AG IA: CPT

## 2023-08-10 PROCEDURE — 86706 HEP B SURFACE ANTIBODY: CPT

## 2023-08-11 ENCOUNTER — ANCILLARY PROCEDURE (OUTPATIENT)
Dept: PET IMAGING | Facility: CLINIC | Age: 48
End: 2023-08-11
Attending: INTERNAL MEDICINE
Payer: COMMERCIAL

## 2023-08-11 ENCOUNTER — PRE VISIT (OUTPATIENT)
Dept: ONCOLOGY | Facility: CLINIC | Age: 48
End: 2023-08-11
Payer: COMMERCIAL

## 2023-08-11 ENCOUNTER — HOSPITAL ENCOUNTER (OUTPATIENT)
Dept: CARDIOLOGY | Facility: CLINIC | Age: 48
Discharge: HOME | End: 2023-08-11
Attending: INTERNAL MEDICINE
Payer: COMMERCIAL

## 2023-08-11 ENCOUNTER — HOSPITAL ENCOUNTER (OUTPATIENT)
Facility: AMBULATORY SURGERY CENTER | Age: 48
Discharge: HOME | End: 2023-08-11
Attending: ORTHOPAEDIC SURGERY
Payer: COMMERCIAL

## 2023-08-11 ENCOUNTER — ONCOLOGY VISIT (OUTPATIENT)
Dept: ONCOLOGY | Facility: CLINIC | Age: 48
End: 2023-08-11
Attending: ORTHOPAEDIC SURGERY
Payer: COMMERCIAL

## 2023-08-11 ENCOUNTER — PATIENT OUTREACH (OUTPATIENT)
Dept: ONCOLOGY | Facility: CLINIC | Age: 48
End: 2023-08-11

## 2023-08-11 VITALS
HEART RATE: 100 BPM | SYSTOLIC BLOOD PRESSURE: 163 MMHG | HEIGHT: 68 IN | TEMPERATURE: 98.4 F | BODY MASS INDEX: 34.08 KG/M2 | DIASTOLIC BLOOD PRESSURE: 95 MMHG | OXYGEN SATURATION: 98 % | WEIGHT: 224.9 LBS

## 2023-08-11 DIAGNOSIS — C83.398 DIFFUSE LARGE B-CELL LYMPHOMA OF EXTRANODAL SITE: Primary | ICD-10-CM

## 2023-08-11 DIAGNOSIS — C83.398 DIFFUSE LARGE B-CELL LYMPHOMA OF EXTRANODAL SITE EXCLUDING SPLEEN AND OTHER SOLID ORGANS: Primary | ICD-10-CM

## 2023-08-11 DIAGNOSIS — Z76.89 PREVENTION OF CHEMOTHERAPY-INDUCED NEUTROPENIA: ICD-10-CM

## 2023-08-11 DIAGNOSIS — M54.42 LEFT-SIDED LOW BACK PAIN WITH LEFT-SIDED SCIATICA, UNSPECIFIED CHRONICITY: ICD-10-CM

## 2023-08-11 DIAGNOSIS — C79.51 METASTATIC BONE TUMOR (H): Primary | ICD-10-CM

## 2023-08-11 DIAGNOSIS — C83.398 DIFFUSE LARGE B-CELL LYMPHOMA OF EXTRANODAL SITE EXCLUDING SPLEEN AND OTHER SOLID ORGANS: ICD-10-CM

## 2023-08-11 LAB
GLUCOSE SERPL-MCNC: 78 MG/DL (ref 70–99)
HBV CORE AB SERPL QL IA: NONREACTIVE
HBV SURFACE AB SERPL IA-ACNC: 231.86 M[IU]/ML
HBV SURFACE AB SERPL IA-ACNC: REACTIVE M[IU]/ML
HBV SURFACE AG SERPL QL IA: NONREACTIVE
HCV AB SERPL QL IA: NONREACTIVE
LVEF ECHO: NORMAL

## 2023-08-11 PROCEDURE — 99205 OFFICE O/P NEW HI 60 MIN: CPT | Performed by: INTERNAL MEDICINE

## 2023-08-11 PROCEDURE — 93306 TTE W/DOPPLER COMPLETE: CPT

## 2023-08-11 PROCEDURE — 93306 TTE W/DOPPLER COMPLETE: CPT | Mod: 26 | Performed by: INTERNAL MEDICINE

## 2023-08-11 PROCEDURE — G0463 HOSPITAL OUTPT CLINIC VISIT: HCPCS | Mod: 25 | Performed by: INTERNAL MEDICINE

## 2023-08-11 RX ORDER — ZINC SULFATE 50(220)MG
CAPSULE ORAL
COMMUNITY

## 2023-08-11 RX ORDER — PREDNISONE 50 MG/1
100 TABLET ORAL DAILY
Qty: 10 TABLET | Refills: 0 | Status: SHIPPED | OUTPATIENT
Start: 2023-08-11 | End: 2023-08-16

## 2023-08-11 RX ORDER — ACYCLOVIR 400 MG/1
400 TABLET ORAL 2 TIMES DAILY
Qty: 60 TABLET | Refills: 5 | Status: SHIPPED | OUTPATIENT
Start: 2023-08-17

## 2023-08-11 RX ORDER — PROCHLORPERAZINE MALEATE 10 MG
10 TABLET ORAL EVERY 6 HOURS PRN
Qty: 30 TABLET | Refills: 2 | Status: SHIPPED | OUTPATIENT
Start: 2023-08-17

## 2023-08-11 RX ORDER — LORATADINE 10 MG/1
10 TABLET ORAL
COMMUNITY

## 2023-08-11 RX ORDER — ALLOPURINOL 300 MG/1
300 TABLET ORAL DAILY
Qty: 30 TABLET | Refills: 0 | Status: SHIPPED | OUTPATIENT
Start: 2023-08-17 | End: 2023-09-16

## 2023-08-11 RX ORDER — ONDANSETRON 8 MG/1
8 TABLET, FILM COATED ORAL EVERY 8 HOURS PRN
Qty: 30 TABLET | Refills: 2 | Status: SHIPPED | OUTPATIENT
Start: 2023-08-17

## 2023-08-11 ASSESSMENT — PAIN SCALES - GENERAL: PAINLEVEL: MODERATE PAIN (4)

## 2023-08-11 NOTE — LETTER
8/11/2023         RE: Rachael Johnson  [address] [place] [address]        Dear Colleague,    Thank you for referring your patient, Rachael Johnson, to the [facility]. Please see a copy of my visit note below.                                     [facility]               NEW PATIENT REFERRAL        Aug 11, 2023   Subjective  REFERRAL SOURCE: Bethel Estrada MD    REASON FOR VISIT: New DLBCL    HISTORY OF PRESENT ILLNESS:  Ms. Rachael Johnson is a 48 year old female who presents for consultation regarding newly diagnosed DLBCL.     She originally developed left lower back pain radiated down the left leg with associated numbness/tingling in May 2023. Was managed conservatively with PT and pain medications without improvement. She eventually had a MRI 7/14/23 which revealed a 9 x 10 x 11 cm mass involving left sacrum and left iliac bone and soft tissues, with encasement of left S1 nerve root. CT showed additional soft tissue nodules in left flank and left abdomen and left external iliac LAD. She saw Ortho and biopsy of the soft tissue mass on 7/27/23 shows DLBCL (GCB subtype) with Ki-67 of 70%. FISH negative for MYC/BCL2/BCL6 rearrangements.     Her back pain the past couple of months has been getting worse. Pain is most noticeable when sitting and sleeping and she has had progressive difficulty walking. Sleeping has been tolerable only on her L side and has significantly affected her quality of sleep. Initially was taking a lot of pain medications along with needing toradol shots in the ED but lately she's been trying to manage it with occasional ibuprofen. She has been having a burning sensation, stinging, shooting pain and numbness that travels down the entirety of her left leg. She thinks she feels the mass when sitting. She denies having any fevers or night sweats. She has noticed that she lost 10 lbs in the past month, without any changes in her appetite or diet.   "      ONCOLOGIC SUMMARY:  Diagnosis:  Diffuse large B-cell lymphoma dx 7/2023, presenting with left back pain and radiculopathy 2/2 large 9 x 10 x 11 cm sacral mass encasing lumbosacral nerve roots. Germinal-center subtype, FISH negative for MYC/BCL2/BCL6 rearrangements, Ki67 70%. Stage IV with involvement of bone, pelvic nodules, and lymphadenopathy above/below diaphragm. . R-IPI 3 (LDH, stage, extranodal sites).     Intent of treatment: Curative    Treatment history:  Mick-R-CHP pending    PAST MEDICAL HISTORY:  Ovarian cyst  Reported history of spina bifada    FAMILY HISTORY:  No cancer in family history.  Maternal grandmother passed away at age 33 possibly due to pregnancy/delivery complications    SOCIAL HISTORY:  Never smoker.   Lives in [place], [place] by herself.  after  passed away from kidney failure. Originally from [place]. No family nearby, sister lives in [facility].   Works as a massage therapist.       Allergies   Allergen Reactions    Seasonal Allergies Other (See Comments)     Itchy, watery eyes, runny nose and congestion   Itchy, watery eyes, runny nose and congestion       Current Outpatient Medications:     cholecalciferol (VITAMIN D3) 25 mcg (1000 units) capsule, Take 1,000 Units by mouth, Disp: , Rfl:     loratadine (CLARITIN) 10 MG tablet, Take 10 mg by mouth, Disp: , Rfl:     ZINC METHIONATE PO, Take  by mouth., Disp: , Rfl:     zinc sulfate (ZINCATE) 220 (50 Zn) MG capsule, by mouth., Disp: , Rfl:   No current facility-administered medications for this visit.     REVIEW OF SYSTEMS:  12-point ROS reviewed and negative other than that mentioned in HPI.     Objective  VITAL SIGNS:  BP (!) 163/95 (BP Location: Right arm, Patient Position: Sitting, Cuff Size: Adult Regular)   Pulse 100   Temp 98.4  F (36.9  C) (Oral)   Ht 1.725 m (5' 7.91\")   Wt 102 kg (224 lb 14.4 oz)   SpO2 98%   BMI 34.28 kg/m      ECOG PS: 1     PHYSICAL EXAM:  General: Awake, alert, in no acute " distress. Oriented x 3.  HEENT: Normocephalic, atraumatic. No scleral icterus.   Lymph: No cervical, supraclavicular, or axillary lymphadenopathy appreciated.   CV: Regular rate and rhythm. No murmurs, rubs, or gallops appreciated.  Resp: Good inspiratory effort, lungs clear to auscultation bilaterally.  GI: Abdomen soft, nontender, nondistended.   Back: Pain to palpation along lower left back/flank.   Ext: No peripheral edema bilaterally.  Neuro: CN II-XII grossly intact. 3/5 L leg extension 5/5 R leg extension. Minimal elevation of L leg against gravity.  Skin: No rash, unusual bruising or prominent lesions.  Psych: Pleasant, normal affect.    LABS:  I reviewed the following labs:  Lab Results   Component Value Date    WBC 7.3 08/10/2023    HGB 11.9 08/10/2023    HCT 36.8 08/10/2023    MCV 88 08/10/2023     08/10/2023     Lab Results   Component Value Date     08/10/2023    POTASSIUM 3.5 08/10/2023    CR 0.83 08/10/2023    BUN 16.8 08/10/2023    CHLORIDE 106 08/10/2023    MONTEZ 9.9 08/10/2023    GLC 78 2023      Lab Results   Component Value Date    ALT 11 08/10/2023    AST 23 08/10/2023    ALKPHOS 106 (H) 08/10/2023    BILITOTAL 0.3 08/10/2023      Lab Results   Component Value Date     (H) 08/10/2023    URIC 5.8 (H) 08/10/2023       IMAGIN23 MRI pelvis:  1. Large enhancing mass (9 x 10 x 11 cm) centered about the left iliac bone and left sacrum, consistent with neoplasm. Recommend clinical correlation for metastatic disease as well as CT chest/abdomen/pelvis for further evaluation.   2. Possible peritoneal nodules as well as enlarged left pelvic lymph nodes, suspicious for metastatic disease.     23 PET:  1. Intense hypermetabolic FDG uptake in destructive soft tissue mass  involving the left iliac bone and sacrum (biopsy-proven diffuse large  B-cell lymphoma). The mass extends into the sacral neural foramina.  Deauville score: 5.  2. Multifocal areas of heterogenous  hypermetabolic FDG uptake  throughout spine, right iliac bone and right acetabulum, likely  lymphomatous involvement.  3. Intensely hypermetabolic right hilar, left common, external iliac  and bilateral inguinal nodes likely represent lymphomatous  involvement.  4. Lymphomatous soft tissue implants in the left retroperitoneum and  right paraspinal region at T9 level.    PATHOLOGY:  7/27/23 Sacral mass biopsy:  Soft tissue mass, perisacral, needle core biopsy:  - Diffuse large B-cell lymphoma, germinal center B-cell subtype  - Negative for EBV by in situ hybridization  - See comment    This specimen is consistent with involvement by a large B cell lymphoma. The lymphoma cells are of germinal center B-cell origin according to the Julien algorithm. MYC and BCL2 are negative which denote a non-double expressor status. There is no evidence of a low-grade lymphoma in this small biopsy.     The differential diagnosis includes diffuse large B-cell lymphoma, NOS and large B-cell lymphoma with MYC and BCL2 and/or BCL6 rearrangements. FISH testing for MYC, BCL2 and BCL6 translocations is pending and the results will be reported separately and described in an addendum to this report.    FISH:  RESULTS:     - Gain of IGH (cannot rule out rearrangement) (19.5%)  - No BCL6 rearrangement  - No MYC rearrangement or IGH::MYC fusion  - No BCL2 rearrangement    Assessment & Plan  #Stage IV bulky DLBCL (GCB subtype)   #Back pain/radiculopathy 2/2 large sacral mass  Pleasant 49 yo F with newly diagnosed DLBCL, stage IV with involvement of multiple bone sites including dominant 9 x 10 x 11 cm sacral mass, pelvic nodules, and lymphadenopathy above/below diaphragm. GCB subtype, FISH negative for rearrangements. R-IPI high-intermediate risk (3). She is quite symptomatic with back pain and sciatica due to encasement of lumbosacral nerves by the mass. Will complete additional staging with lumbar puncture to rule out CNS involvement given the  peripheral nerve involvement.     We reviewed the overall diagnosis and prognosis of DLBCL. I recommend first-line therapy with polatuzumab-R-CHP which was recently FDA approved based on the Phase 3 international POLARIX trial (https://www.nejm.org/doi/full/10.1056/OTKQjn2989267). The primary endpoint of progression, relapse, or death was significantly lower in the Mick-R-CHP group than in the R-CHOP group with a hazard ratio of 0.73, and 2-year progression-free survival was 76.7% in the Mick-R-CHP group compared to 70.2% in the standard R-CHOP group. Toxicity profile was also similar between the two regimens. She qualifies with a R-IPI of 3.      We reviewed the logistics and schedule of mick-R-CHP (every 3 weeks, all done as an outpatient in the infusion center). We reviewed potential side effects including possible Rituximab infusion reaction the first cycle, cytopenias/infection, fatigue, GI symptoms, neuropathy, and low chance of anthracycline cardiotoxicity. She is agreeable to proceed. We will plan for a total of 6 cycles with restaging PET-CT every 2 cycles. We also discussed that there may be a role for consolidative radiation given the bulky sacral mass. We will plan to start the first cycle at the [facility] while we look for alternative areas that are closer to her home. If that is not possible, she is agreeable to doing her infusions here. [facility] need [facility] for some stays depending on how early the appointments are. Since it may take a few weeks to get an infusion appt, will start prednisone now to help with pain/symptoms.     #Ppx  - TLS ppx: baseline uric acid 5.8. Start allopurinol 300 mg daily. Encouraged good hydration.  - ID ppx: start  mg BID.      PLAN:  - Start prednisone 100 mg daily x 5 days  - Staging lumbar puncture  - Infusion appt for mick-R-CHP ASAP    Patient was seen with medical student Aj Auguste.     Total of 70 minutes on patient visit, reviewing records, interpreting test  results, placing orders, and documentation on the day of service.    Kalpana Caicedo MD  Attending Physician, [facility]

## 2023-08-11 NOTE — NURSING NOTE
"Oncology Rooming Note    August 11, 2023 4:00 PM   Rachael Johnson is a 48 year old female who presents for:    Chief Complaint   Patient presents with    Oncology Clinic Visit     Metastatic bone tumor     Initial Vitals: BP (!) 163/95 (BP Location: Right arm, Patient Position: Sitting, Cuff Size: Adult Regular)   Pulse 100   Temp 98.4  F (36.9  C) (Oral)   Ht 1.725 m (5' 7.91\")   Wt 102 kg (224 lb 14.4 oz)   SpO2 98%   BMI 34.28 kg/m   Estimated body mass index is 34.28 kg/m  as calculated from the following:    Height as of this encounter: 1.725 m (5' 7.91\").    Weight as of this encounter: 102 kg (224 lb 14.4 oz). Body surface area is 2.21 meters squared.  Moderate Pain (4) Comment: Data Unavailable   No LMP recorded. Patient is postmenopausal.  Allergies reviewed: Yes  Medications reviewed: Yes    Medications: Medication refills not needed today.  Pharmacy name entered into EPIC: Data Unavailable    Clinical concerns: none.       Kiran Wong"

## 2023-08-11 NOTE — PROGRESS NOTES
[facility]: Cancer Care Initial Note                                    Discussion with Patient:                                                      Met with Rachael after office visit/consult with Dr. Caicedo. Introduced self as RN Care Coordinator. Went over contact information for [facility]. Discussed roles of RNCC, MD, MAYUR, nurse triage line, and clinic coordinators. Discussed how to get a hold of different team members via [employer] and at [phone]. Authorization to discuss information form signed.   Patient will start chemotherapy within the next few weeks.          Assessment:                                                      Initial  Current living arrangement:: I live alone  Informal Support system:: Friends  Bed or wheelchair confined:: No  Mobility Status: Independent w/Device (uses a cane)  Transportation means:: Regular car  Medication adherence problem (GOAL):: No  Knowledgeable about how to use meds:: Yes  Medication side effects suspected:: No    Plan of Care Education Review:   Assessment completed with:: Patient    Current living arrangement  I live alone    Plan of Care Education   Yearly learning assessment completed?: Yes (see Education tab)  Diagnosis:: DLBCL  Does patient understand diagnosis?: Yes  Tx plan/regimen:: Mick-R-CHP  Does patient understand treatment plan/regimen?: Yes  Preparing for treatment:: Reviewed treatment preparation information with patient (vascular access, day of chemo, visitor policy, what to bring, etc.)  Vascular access:: Peripheral IV  Side effect education:: Diarrhea/Constipation;Fatigue;Hair loss;Infection;Lab value monitoring (anemia, neutropenia, thrombocytopenia);Urinary;Neuropathy;Nausea/Vomiting;Mylosuppression  Transportation means:: Regular car  Safety/self care at home reviewed with patient:: Yes  Informal Support system:: Friends  When to call provider:: Bleeding;Increased shortness of breath;New/worsening pain;Shaking  chills;Temperature >100.4F;Uncontrolled nausea/vomiting;Uncontrolled diarrhea/constipation  Reasons for deferring treatment reviewed with patient:: Yes    Evaluation of Learning  Patient Education Provided: Yes  Readiness:: Acceptance  Method:: Booklet/Handout;Literature;Explanation  Response:: Verbalizes understanding           Intervention/Education provided during outreach:                                                       We reviewed the common side effects, logistics, and when to contact the care team while receiving Mick-R-CHP    Mick-R-CHP expected to start within the next few weeks  Patient to follow up as scheduled at next appt  Patient to call/[employer] message with updates  Confirmed patient has clinic and triage numbers     Patient to follow up as scheduled at next appt    Signature:  Jie Molina RN

## 2023-08-13 ENCOUNTER — HEALTH MAINTENANCE LETTER (OUTPATIENT)
Age: 48
End: 2023-08-13

## 2023-08-15 ENCOUNTER — PATIENT OUTREACH (OUTPATIENT)
Dept: ONCOLOGY | Facility: CLINIC | Age: 48
End: 2023-08-15
Payer: COMMERCIAL

## 2023-08-15 ENCOUNTER — MYC MEDICAL ADVICE (OUTPATIENT)
Dept: ONCOLOGY | Facility: CLINIC | Age: 48
End: 2023-08-15
Payer: COMMERCIAL

## 2023-08-15 DIAGNOSIS — C83.398 DIFFUSE LARGE B-CELL LYMPHOMA OF EXTRANODAL SITE EXCLUDING SPLEEN AND OTHER SOLID ORGANS: Primary | ICD-10-CM

## 2023-08-15 NOTE — PROGRESS NOTES
[facility]: Cancer Care                                                                                          Writer called and spoke with [facility] as Rachael would like to possibly get infusion appointments there as it would be closer to home. Per Marie the , Rachael will just have to make an appointment with Dr. Elizabeth to start receiving treatments at [facility]. Information given to both Rachael and Dr. Caicedo.   [facility] oncology: [phone]    Social work referral sent as Rachael has some financial concerns as she will have to work less often during treatment.     Rachael stated that she started the prednisone but not the allopurinol, she did  the allopurinol from the pharmacy so she has it. Writer reinforced education tumor lysis and informed her to start taking the Allopurinol. Rachael verbalized understanding.   Still having discomfort while sitting and laying down, having difficulty sleeping, and hoping she can get in for an infusion appointment soon to start treatment. Scheduling currently working on appointment request.     Jie Molina, RN, BSN  RN Care Coordinator   [address] [place] [id number] [phone]

## 2023-08-18 ENCOUNTER — PATIENT OUTREACH (OUTPATIENT)
Dept: ONCOLOGY | Facility: CLINIC | Age: 48
End: 2023-08-18
Payer: COMMERCIAL

## 2023-08-18 ENCOUNTER — PATIENT OUTREACH (OUTPATIENT)
Dept: CARE COORDINATION | Facility: CLINIC | Age: 48
End: 2023-08-18
Payer: COMMERCIAL

## 2023-08-18 DIAGNOSIS — Z51.11 ENCOUNTER FOR ANTINEOPLASTIC CHEMOTHERAPY: Primary | ICD-10-CM

## 2023-08-18 LAB
PATH REPORT.ADDENDUM SPEC: ABNORMAL
PATH REPORT.COMMENTS IMP SPEC: ABNORMAL
PATH REPORT.COMMENTS IMP SPEC: YES
PATH REPORT.FINAL DX SPEC: ABNORMAL
PATH REPORT.GROSS SPEC: ABNORMAL
PATH REPORT.MICROSCOPIC SPEC OTHER STN: ABNORMAL
PATH REPORT.RELEVANT HX SPEC: ABNORMAL
PHOTO IMAGE: ABNORMAL

## 2023-08-18 NOTE — PROGRESS NOTES
Social Work - Intervention  [facility]  Data/Intervention:    Patient Name: Rachael Johnson Goes By: Rachael CARRILLOB/Age: 1975 (48 year old)     Visit Type: telephone and MyChart  Referral Source: [facility] [employer]  Reason for Referral: [facility] & Financial Assistance    Collaborated With:    -Patient     Psychosocial Information/Concerns:  SW received referral to connect with patient regarding [facility] stay. Patient may be having phone troubles and was requested to connect via MyChart if not able to connect through phone call.     Intervention/Education/Resources Provided:  SW attempted to reach patient via phone, but was unable to leave . SW sent patient [employer] message introducing self and explaining reason for outreach.    SW and patient were able to connect via phone call and SW provided information on [facility] and financial grants.      Assessment/Plan:  SW will work on financial grants and financial assistance program applications. SW will continue to remain available as needed.  Provided patient/family with contact information and availability.    BETTY Louise,MARCELINO  Hematology/Oncology Social Worker  Phone:[phone] Pager: [phone]

## 2023-08-18 NOTE — PROGRESS NOTES
[facility]: Cancer Care                                                                                          Writer returned phone call to Rachael this morning. Rachael stated that she does not have a  available to bring her to her appointments and first round of chemotherapy next week. Rachael asked about pushing the chemotherapy back a week.   [facility], however she will not have a caregiver with her which is a requirement of [facility]. Message sent to social work.   Rachael will reach out to [facility] in [place] today to set up an appointment with their oncologist, to see if she could start receiving treatment sooner and closer to home.   Message sent to Dr. Caicedo about potential delay in treatment.     Jie Molina, RN, BSN  RN Care Coordinator   [address] [place] [id number] [phone]

## 2023-08-22 NOTE — TELEPHONE ENCOUNTER
Rachael Franco mentioned previously about wanting to talk about pain medications with the provider during the visit she has later this week. I am unsure if she is having more pain where she is needing more medications now or if she is expecting pain when she starts treatment. It wouldn't hurt to assess further.  Never smoker

## 2023-08-22 NOTE — TELEPHONE ENCOUNTER
"Oncology Nurse Triage - Pain  Situation: Rachael reporting the following symptoms: Pain    Background:   Treating Provider:   Dr. Kalpana Caicedo    Date of last office visit: 8/11/23    Recent Treatments: Prednisone 100mg daily x5d completed on 8/16/23; looking to start treatments end of month per notes.     Assessment:     Location: back, abdomen/lymph node  Onset few months for back pain; lymph and abdomen started last week  Duration/Frequency: constant  Rates: 5/10  Quality: pound, sharp  Current med(s) used: completed prednisone burst as prescribed- said this was helpful, shooting pain in foot is less since taking this. Had stopped anything over the counter as did not know if she could take this.   Worsens or relieves with: worse when laying down, walking; using ice packs.  Did have a headache yesterday, contributes to not sleeping well d/t pain.    Denies fevers/chills, chest pain, SOB, n/v, bowel & bladder issues, new or increased bleeding.     Per visit note on 8/11/23, \"Her back pain the past couple of months has been getting worse. Pain is most noticeable when sitting and sleeping and she has had progressive difficulty walking. Sleeping has been tolerable only on her L side and has significantly affected her quality of sleep. Initially was taking a lot of pain medications along with needing toradol shots in the ED but lately she's been trying to manage it with occasional ibuprofen. She has been having a burning sensation, stinging, shooting pain and numbness that travels down the entirety of her left leg. She thinks she feels the mass when sitting. She denies having any fevers or night sweats. She has noticed that she lost 10 lbs in the past month, without any changes in her appetite or diet.\"    Recommendations:   Writer recommended pt resume -600mg q6-8h PRN for pain, continue ice, advised pt call triage line back if symptoms do not improve or worsen, informed will send message to Natalya Rhodes so she is " aware for appt on 8/25. Writer requested pt not wait until upcoming appts to address pain if symptoms worsen. Pt voiced understanding.

## 2023-08-23 NOTE — PROGRESS NOTES
BMT ONC Adult Lumbar Puncture Procedure Note    August 23, 2023    Procedure: Lumbar Puncture to obtain CSF     Diagnosis: diffuse large B cell lymphoma    Learning needs assessment complete within 12 months: YES    Vitals reviewed:  YES    Informed Consent: Procedure, benefits, risks, and alternatives explained to the patient who verbalized understanding of the information and agreed to proceed with lumbar puncture. Risks include bleeding, headache, and or infection.  Patient safety checklist completed.    Labs: Reviewed:    Platelet Count   Date Value Ref Range Status   08/24/2023 296 150 - 450 10e3/uL Final     No results found for: INR     Description:. The patient was seated at the bedside with knees dangling. The L4-5 disc space was prepped and draped in a sterile fashion. Local anesthesia with 3 mL 1% preservative-free lidocaine was used. A 22 gauge, 4 inch needle was placed on the first  attempt.  7 mL spinal fluid collected. Specimen appears light yellow, then clear. Specimen sent for cell count and differential, protein, glucose, and flow cytometry.  The needle was removed and a bandage was applied to the site.  Patient tolerated well.    Post-procedure pain assessment: 0 out of 10 on the numeric pain rating scale from the procedure.  Interventions: No    Complications: No     Disposition: Patient will remain on back for 1 hour post procedure. Avoid soaking in a tub tonight.  Call if develops headaches, fevers and or chills.     Performed by:   Avani Garcia PA-C

## 2023-08-23 NOTE — PROGRESS NOTES
[facility] C[facility]               NEW PATIENT REFERRAL        Aug 25, 2023   Subjective   REFERRAL SOURCE: Bethel Estrada MD    REASON FOR VISIT: New DLBCL    HISTORY OF PRESENT ILLNESS:  Ms. Rachael Johnson is a 48 year old female who presents for consultation regarding newly diagnosed DLBCL.     She originally developed left lower back pain radiated down the left leg with associated numbness/tingling in May 2023. Was managed conservatively with PT and pain medications without improvement. She eventually had a MRI 7/14/23 which revealed a 9 x 10 x 11 cm mass involving left sacrum and left iliac bone and soft tissues, with encasement of left S1 nerve root. CT showed additional soft tissue nodules in left flank and left abdomen and left external iliac LAD. She saw Ortho and biopsy of the soft tissue mass on 7/27/23 shows DLBCL (GCB subtype) with Ki-67 of 70%. FISH negative for MYC/BCL2/BCL6 rearrangements.     She presents to clinic for follow-up prior to C1D1.  She is overall stable.  She continues with the low left back/gluteal pain. Pain is most noticeable when sitting and sleeping and she has had progressive difficulty walking.   She is taking her Oxycodone as needed, been using about once a day or every other day at this time.  She continues with a numbness/tingling that starts in the L glute and radiates down the entire leg into the foot- reports this does impact her walking and she uses a cane.      She reports her appetite has been decreased but she is making sure to eat at least 2 meals per day. She does have some intermittent constipation from the pain medication but she eats prunes to manage.  Denies fevers/chills, SOB/cough, dental pain, abd pain, N/V/D, bleeding issues, neuropathy in upper extremities, new pains, new lumps/bumps, chest pain/palpitations, edema.     ONCOLOGIC SUMMARY:  Diagnosis:  Diffuse large B-cell lymphoma dx 7/2023, presenting with left back pain  and radiculopathy 2/2 large 9 x 10 x 11 cm sacral mass encasing lumbosacral nerve roots. Germinal-center subtype, FISH negative for MYC/BCL2/BCL6 rearrangements, Ki67 70%. Stage IV with involvement of bone, pelvic nodules, and lymphadenopathy above/below diaphragm. . R-IPI 3 (LDH, stage, extranodal sites).     Intent of treatment: Curative    Treatment history:  Mick-R-CHP pending    PAST MEDICAL HISTORY:  Ovarian cyst  Reported history of spina bifada    FAMILY HISTORY:  No cancer in family history.  Maternal grandmother passed away at age 33 possibly due to pregnancy/delivery complications    SOCIAL HISTORY:  Never smoker.   Lives in [place], [place] by herself.  after  passed away from kidney failure. Originally from [place]. No family nearby, sister lives in [place].   Works as a massage therapist.       Allergies   Allergen Reactions    Seasonal Allergies Other (See Comments)     Itchy, watery eyes, runny nose and congestion   Itchy, watery eyes, runny nose and congestion       Current Outpatient Medications:     acyclovir (ZOVIRAX) 400 MG tablet, Take 1 tablet (400 mg) by mouth 2 times daily Viral Prophylaxis. (Patient not taking: Reported on 8/24/2023), Disp: 60 tablet, Rfl: 5    allopurinol (ZYLOPRIM) 300 MG tablet, Take 1 tablet (300 mg) by mouth daily for 30 days (Patient not taking: Reported on 8/24/2023), Disp: 30 tablet, Rfl: 0    cholecalciferol (VITAMIN D3) 25 mcg (1000 units) capsule, Take 1,000 Units by mouth, Disp: , Rfl:     cyclobenzaprine (FLEXERIL) 5 MG tablet, Take 1 tablet (5 mg) by mouth 3 times daily as needed for muscle spasms, Disp: 20 tablet, Rfl: 0    loratadine (CLARITIN) 10 MG tablet, Take 10 mg by mouth, Disp: , Rfl:     ondansetron (ZOFRAN) 8 MG tablet, Take 1 tablet (8 mg) by mouth every 8 hours as needed for nausea (vomiting) (Patient not taking: Reported on 8/24/2023), Disp: 30 tablet, Rfl: 2    oxyCODONE (ROXICODONE) 5 MG tablet, Take 1 tablet (5 mg) by mouth  every 6 hours as needed for moderate to severe pain (Patient not taking: Reported on 8/24/2023), Disp: 30 tablet, Rfl: 0    predniSONE (DELTASONE) 50 MG tablet, Take 1 tablet (50 mg) by mouth 2 times daily for 5 days Take on Days 1 through 5. Take first dose in AM prior to chemotherapy., Disp: 10 tablet, Rfl: 5    prochlorperazine (COMPAZINE) 10 MG tablet, Take 1 tablet (10 mg) by mouth every 6 hours as needed for nausea or vomiting (Patient not taking: Reported on 8/24/2023), Disp: 30 tablet, Rfl: 2    sulfamethoxazole-trimethoprim (BACTRIM DS) 800-160 MG tablet, Take 1 tablet by mouth 2 times daily On Mondays and Tuesdays, Disp: 16 tablet, Rfl: 5    ZINC METHIONATE PO, Take  by mouth., Disp: , Rfl:     zinc sulfate (ZINCATE) 220 (50 Zn) MG capsule, by mouth., Disp: , Rfl:   No current facility-administered medications for this visit.    Facility-Administered Medications Ordered in Other Visits:     cycloPHOSphamide (CYTOXAN) 1,660 mg in sodium chloride 0.9 % 583 mL infusion, 750 mg/m2 (Treatment Plan Recorded), Intravenous, Once, Kalpana Caicedo MD    DOXOrubicin (ADRIAMYCIN) injection 110 mg, 50 mg/m2 (Treatment Plan Recorded), Intravenous Push, Once, Kalpana Caicedo MD    fosaprepitant (EMEND) 150 mg in sodium chloride 0.9 % 275 mL intermittent infusion, 150 mg, Intravenous, Once, Kalpana Caicedo MD, Last Rate: 825 mL/hr at 08/25/23 0852, 150 mg at 08/25/23 0852    pegfilgrastim (NEULASTA Onpro Kit) On-body injector 6 mg, 6 mg, Subcutaneous, Once, Kalpana Caicedo MD    polatuzumab vedotin-piiq (POLIVY) 170 mg in D5W 118.5 mL intermittent infusion, 170 mg, Intravenous, Once, Kalpana Caicedo MD    riTUXimab-abbs (TRUXIMA) 800 mg in sodium chloride 0.9 % 800 mL infusion, 375 mg/m2 (Treatment Plan Recorded), Intravenous, Once, Kalpana Caicedo MD     REVIEW OF SYSTEMS:  12-point ROS reviewed and negative other than that mentioned in HPI.     Objective   VITAL SIGNS:  There were no vitals taken for this visit. (Done in infusion center)    ECOG PS:  1     PHYSICAL EXAM:  General: No acute distress, AO x3  HEENT: Sclera anicteric. Oral exam deferred  Lymph: No lymphadenopathy in neck  Heart: Regular, rate, and rhythm  Lungs: Clear to ascultation bilaterally  Abdomen: Positive bowel sounds.   Extremities: no lower extremity edema  Neuro: Cranial nerves grossly intact  Rash: none on exposed skin     LABS:  I reviewed the following labs:  Lab Results   Component Value Date    WBC 5.7 08/24/2023    HGB 11.1 (L) 08/24/2023    HCT 35.0 08/24/2023    MCV 90 08/24/2023     08/24/2023     Lab Results   Component Value Date     08/10/2023    POTASSIUM 3.5 08/10/2023    CR 0.83 08/10/2023    BUN 16.8 08/10/2023    CHLORIDE 106 08/10/2023    MONTEZ 9.9 08/10/2023    GLC 78 08/11/2023      Lab Results   Component Value Date    ALT 11 08/10/2023    AST 23 08/10/2023    ALKPHOS 106 (H) 08/10/2023    BILITOTAL 0.3 08/10/2023      Lab Results   Component Value Date     (H) 08/10/2023    URIC 5.8 (H) 08/10/2023       Assessment & Plan   #Stage IV bulky DLBCL (GCB subtype)   #Back pain/radiculopathy 2/2 large sacral mass  Pleasant 48 year old female with newly diagnosed DLBCL, stage IV with involvement of multiple bone sites including dominant 9 x 10 x 11 cm sacral mass, pelvic nodules, and lymphadenopathy above/below diaphragm. GCB subtype, FISH negative for rearrangements. R-IPI high-intermediate risk (3). She is quite symptomatic with back pain and sciatica due to encasement of lumbosacral nerves by the mass. 8/24 staging LP- flow  pending    She will receive first-line therapy with polatuzumab-R-CHP which was recently FDA approved based on the Phase 3 international POLARIX trial (https://www.nejm.org/doi/full/10.1056/HUTQqy1351011).    - We will plan for a total of 6 cycles with restaging PET-CT every 2 cycles. She is aware that there may be a role for consolidative radiation given the bulky sacral mass.   - Plan to [facility] the first cycle at the [facility] while we  look for alternative areas that are closer to her home.   - May need [facility] for some stays depending on how early the appointments are.   - Proceed with C1D1 Mick-R-CHP today (8/25)   - Will be due for PET after C2 ~10/4    #Ppx  - TLS ppx: baseline uric acid 5.8. Start allopurinol 300 mg daily. Encouraged good hydration.  -  mg BID.    - Bactrim BID on Mondays and Tuesdays     #L low black and gluteal pain:  Patient reports ongoing low left back/gluteal pain that radiates down the entire leg into the feet.    - Discussed to stop taking ibuprofen and she can take Tylenol if needed  - Oxycodone 5 mg Q6 hours PRN  - Flexeril 5 mg TID PRN     PLAN:  - Proceed with C1D1 today (8/25)  - Virtual visit with Natalya 9/1 for toxicity check  - Patient reports her future visits will be at a local facility- per patient appt scheduled for 9/1    65 minutes spent on the date of the encounter doing chart review, review of test results, interpretation of tests, patient visit, and documentation     AMA Nickerson CNP

## 2023-08-24 ENCOUNTER — APPOINTMENT (OUTPATIENT)
Dept: LAB | Facility: CLINIC | Age: 48
End: 2023-08-24
Attending: INTERNAL MEDICINE
Payer: COMMERCIAL

## 2023-08-24 ENCOUNTER — OFFICE VISIT (OUTPATIENT)
Dept: ONCOLOGY | Facility: CLINIC | Age: 48
End: 2023-08-24
Attending: INTERNAL MEDICINE
Payer: COMMERCIAL

## 2023-08-24 VITALS
RESPIRATION RATE: 18 BRPM | OXYGEN SATURATION: 100 % | TEMPERATURE: 99.2 F | DIASTOLIC BLOOD PRESSURE: 77 MMHG | SYSTOLIC BLOOD PRESSURE: 123 MMHG | HEART RATE: 109 BPM

## 2023-08-24 DIAGNOSIS — C83.398 DIFFUSE LARGE B-CELL LYMPHOMA OF EXTRANODAL SITE EXCLUDING SPLEEN AND OTHER SOLID ORGANS: Primary | ICD-10-CM

## 2023-08-24 DIAGNOSIS — Z51.11 ENCOUNTER FOR ANTINEOPLASTIC CHEMOTHERAPY: ICD-10-CM

## 2023-08-24 DIAGNOSIS — M54.42 LEFT-SIDED LOW BACK PAIN WITH LEFT-SIDED SCIATICA, UNSPECIFIED CHRONICITY: ICD-10-CM

## 2023-08-24 DIAGNOSIS — Z76.89 PREVENTION OF CHEMOTHERAPY-INDUCED NEUTROPENIA: Primary | ICD-10-CM

## 2023-08-24 DIAGNOSIS — C83.398 DIFFUSE LARGE B-CELL LYMPHOMA OF EXTRANODAL SITE EXCLUDING SPLEEN AND OTHER SOLID ORGANS: ICD-10-CM

## 2023-08-24 LAB
BASOPHILS # BLD AUTO: 0 10E3/UL (ref 0–0.2)
BASOPHILS NFR BLD AUTO: 0 %
EOSINOPHIL # BLD AUTO: 0.1 10E3/UL (ref 0–0.7)
EOSINOPHIL NFR BLD AUTO: 2 %
ERYTHROCYTE [DISTWIDTH] IN BLOOD BY AUTOMATED COUNT: 13.9 % (ref 10–15)
GLUCOSE CSF-MCNC: 55 MG/DL (ref 40–70)
HCT VFR BLD AUTO: 35 % (ref 35–47)
HGB BLD-MCNC: 11.1 G/DL (ref 11.7–15.7)
IMM GRANULOCYTES # BLD: 0 10E3/UL
IMM GRANULOCYTES NFR BLD: 0 %
LYMPHOCYTES # BLD AUTO: 1.1 10E3/UL (ref 0.8–5.3)
LYMPHOCYTES NFR BLD AUTO: 19 %
MCH RBC QN AUTO: 28.6 PG (ref 26.5–33)
MCHC RBC AUTO-ENTMCNC: 31.7 G/DL (ref 31.5–36.5)
MCV RBC AUTO: 90 FL (ref 78–100)
MONOCYTES # BLD AUTO: 0.4 10E3/UL (ref 0–1.3)
MONOCYTES NFR BLD AUTO: 7 %
NEUTROPHILS # BLD AUTO: 4.1 10E3/UL (ref 1.6–8.3)
NEUTROPHILS NFR BLD AUTO: 72 %
NRBC # BLD AUTO: 0 10E3/UL
NRBC BLD AUTO-RTO: 0 /100
PLATELET # BLD AUTO: 296 10E3/UL (ref 150–450)
PROT CSF-MCNC: 150 MG/DL (ref 15–45)
RBC # BLD AUTO: 3.88 10E6/UL (ref 3.8–5.2)
WBC # BLD AUTO: 5.7 10E3/UL (ref 4–11)

## 2023-08-24 PROCEDURE — 88184 FLOWCYTOMETRY/ TC 1 MARKER: CPT | Performed by: PHYSICIAN ASSISTANT

## 2023-08-24 PROCEDURE — 84157 ASSAY OF PROTEIN OTHER: CPT | Performed by: PHYSICIAN ASSISTANT

## 2023-08-24 PROCEDURE — 88188 FLOWCYTOMETRY/READ 9-15: CPT | Mod: GC | Performed by: PATHOLOGY

## 2023-08-24 PROCEDURE — 82945 GLUCOSE OTHER FLUID: CPT | Performed by: PHYSICIAN ASSISTANT

## 2023-08-24 PROCEDURE — 89050 BODY FLUID CELL COUNT: CPT | Performed by: PHYSICIAN ASSISTANT

## 2023-08-24 PROCEDURE — 36415 COLL VENOUS BLD VENIPUNCTURE: CPT | Performed by: PHYSICIAN ASSISTANT

## 2023-08-24 PROCEDURE — 62270 DX LMBR SPI PNXR: CPT | Performed by: PHYSICIAN ASSISTANT

## 2023-08-24 PROCEDURE — 85025 COMPLETE CBC W/AUTO DIFF WBC: CPT | Performed by: PHYSICIAN ASSISTANT

## 2023-08-24 RX ORDER — HEPARIN SODIUM,PORCINE 10 UNIT/ML
5-20 VIAL (ML) INTRAVENOUS DAILY PRN
Status: CANCELLED | OUTPATIENT
Start: 2023-08-25

## 2023-08-24 RX ORDER — HEPARIN SODIUM (PORCINE) LOCK FLUSH IV SOLN 100 UNIT/ML 100 UNIT/ML
5 SOLUTION INTRAVENOUS
Status: CANCELLED | OUTPATIENT
Start: 2023-08-25

## 2023-08-24 RX ORDER — MEPERIDINE HYDROCHLORIDE 25 MG/ML
25 INJECTION INTRAMUSCULAR; INTRAVENOUS; SUBCUTANEOUS EVERY 30 MIN PRN
Status: CANCELLED | OUTPATIENT
Start: 2023-08-25

## 2023-08-24 RX ORDER — DOXORUBICIN HYDROCHLORIDE 2 MG/ML
50 INJECTION, SOLUTION INTRAVENOUS ONCE
Status: CANCELLED | OUTPATIENT
Start: 2023-08-25

## 2023-08-24 RX ORDER — ALBUTEROL SULFATE 90 UG/1
1-2 AEROSOL, METERED RESPIRATORY (INHALATION)
Status: CANCELLED
Start: 2023-08-25

## 2023-08-24 RX ORDER — METHYLPREDNISOLONE SODIUM SUCCINATE 125 MG/2ML
125 INJECTION, POWDER, LYOPHILIZED, FOR SOLUTION INTRAMUSCULAR; INTRAVENOUS
Status: CANCELLED | OUTPATIENT
Start: 2023-08-25

## 2023-08-24 RX ORDER — ACETAMINOPHEN 325 MG/1
650 TABLET ORAL ONCE
Status: CANCELLED | OUTPATIENT
Start: 2023-08-25

## 2023-08-24 RX ORDER — PREDNISONE 50 MG/1
50 TABLET ORAL 2 TIMES DAILY
Qty: 10 TABLET | Refills: 5 | Status: SHIPPED | OUTPATIENT
Start: 2023-08-24 | End: 2023-08-29

## 2023-08-24 RX ORDER — EPINEPHRINE 1 MG/ML
0.3 INJECTION, SOLUTION INTRAMUSCULAR; SUBCUTANEOUS EVERY 5 MIN PRN
Status: CANCELLED | OUTPATIENT
Start: 2023-08-25

## 2023-08-24 RX ORDER — SULFAMETHOXAZOLE/TRIMETHOPRIM 800-160 MG
1 TABLET ORAL 2 TIMES DAILY
Qty: 16 TABLET | Refills: 5 | Status: SHIPPED | OUTPATIENT
Start: 2023-08-24

## 2023-08-24 RX ORDER — OXYCODONE HYDROCHLORIDE 5 MG/1
5 TABLET ORAL EVERY 6 HOURS PRN
Qty: 30 TABLET | Refills: 0 | Status: SHIPPED | OUTPATIENT
Start: 2023-08-24 | End: 2023-09-01

## 2023-08-24 RX ORDER — MEPERIDINE HYDROCHLORIDE 25 MG/ML
25 INJECTION INTRAMUSCULAR; INTRAVENOUS; SUBCUTANEOUS
Status: CANCELLED | OUTPATIENT
Start: 2023-08-25

## 2023-08-24 RX ORDER — METHYLPREDNISOLONE SODIUM SUCCINATE 125 MG/2ML
125 INJECTION, POWDER, LYOPHILIZED, FOR SOLUTION INTRAMUSCULAR; INTRAVENOUS
Status: CANCELLED
Start: 2023-08-25

## 2023-08-24 RX ORDER — DIPHENHYDRAMINE HYDROCHLORIDE 50 MG/ML
50 INJECTION INTRAMUSCULAR; INTRAVENOUS
Status: CANCELLED
Start: 2023-08-25

## 2023-08-24 RX ORDER — DIPHENHYDRAMINE HCL 25 MG
50 CAPSULE ORAL ONCE
Status: CANCELLED | OUTPATIENT
Start: 2023-08-25

## 2023-08-24 RX ORDER — LORAZEPAM 2 MG/ML
0.5 INJECTION INTRAMUSCULAR EVERY 4 HOURS PRN
Status: CANCELLED | OUTPATIENT
Start: 2023-08-25

## 2023-08-24 RX ORDER — PALONOSETRON 0.05 MG/ML
0.25 INJECTION, SOLUTION INTRAVENOUS ONCE
Status: CANCELLED
Start: 2023-08-25

## 2023-08-24 RX ORDER — ALBUTEROL SULFATE 0.83 MG/ML
2.5 SOLUTION RESPIRATORY (INHALATION)
Status: CANCELLED | OUTPATIENT
Start: 2023-08-25

## 2023-08-24 RX ORDER — CYCLOBENZAPRINE HCL 5 MG
5 TABLET ORAL 3 TIMES DAILY PRN
Qty: 20 TABLET | Refills: 0 | Status: SHIPPED | OUTPATIENT
Start: 2023-08-24

## 2023-08-24 ASSESSMENT — PAIN SCALES - GENERAL: PAINLEVEL: SEVERE PAIN (6)

## 2023-08-24 NOTE — NURSING NOTE
"Oncology Rooming Note    August 24, 2023 12:52 PM   Rachael Johnson is a 48 year old female who presents for:    Chief Complaint   Patient presents with    Blood Draw     Labs drawn via  by RN. VS taken.    Oncology Clinic Visit     Here fpr Lumbar puncture r/t Diffuse large cell lymphoma.     Initial Vitals: /77   Pulse 109   Temp 99.2  F (37.3  C) (Oral)   Resp 18   SpO2 100%  Estimated body mass index is 34.28 kg/m  as calculated from the following:    Height as of 8/11/23: 1.725 m (5' 7.91\").    Weight as of 8/11/23: 102 kg (224 lb 14.4 oz). There is no height or weight on file to calculate BSA.  Severe Pain (6) Comment: Data Unavailable   No LMP recorded. Patient is postmenopausal.  Allergies reviewed: Yes  Medications reviewed: Yes    Medications: Medication refills not needed today.  Pharmacy name entered into EPIC:    [id number] - [place], [place] - [address] [facility] [place], [place] - [address] [facility] [phone]    Clinical concerns: None       Louis Bunch RN              "

## 2023-08-24 NOTE — NURSING NOTE
Chief Complaint   Patient presents with    Blood Draw     Labs drawn via  by RN. VS taken.     Labs collected from venipuncture by RN. Vitals taken. Checked in for appointment(s).     Yefri Penaloza RN

## 2023-08-24 NOTE — Clinical Note
8/24/2023         RE: Rachael Johnson  [address] [place] [address]        Dear Colleague,    Thank you for referring your patient, Rachael Johnson, to the [facility]. Please see a copy of my visit note below.    BMT ONC Adult Lumbar Puncture Procedure Note    August 23, 2023    Procedure: Lumbar Puncture to obtain CSF     Diagnosis: diffuse large B cell lymphoma    Learning needs assessment complete within 12 months: YES    Vitals reviewed:  YES    Informed Consent: Procedure, benefits, risks, and alternatives explained to the patient who verbalized understanding of the information and agreed to proceed with lumbar puncture. Risks include bleeding, headache, and or infection.  Patient safety checklist completed.    Labs: Reviewed:    Platelet Count   Date Value Ref Range Status   08/24/2023 296 150 - 450 10e3/uL Final     No results found for: INR     Description:. The patient was seated at the bedside with knees dangling. The L4-5 disc space was prepped and draped in a sterile fashion. Local anesthesia with 3 mL 1% preservative-free lidocaine was used. A 22 gauge, 4 inch needle was placed on the first  attempt.  7 mL spinal fluid collected. Specimen appears light yellow, then clear. Specimen sent for cell count and differential, protein, glucose, and flow cytometry.  The needle was removed and a bandage was applied to the site.  Patient tolerated well.    Post-procedure pain assessment: 0 out of 10 on the numeric pain rating scale from the procedure.  Interventions: No    Complications: No     Disposition: Patient will remain on back for 1 hour post procedure. Avoid soaking in a tub tonight.  Call if develops headaches, fevers and or chills.     Performed by:   Avani Garcia PA-C        Again, thank you for allowing me to participate in the care of your patient.        Sincerely,        Avani Garcia PA-C

## 2023-08-25 ENCOUNTER — INFUSION THERAPY VISIT (OUTPATIENT)
Dept: ONCOLOGY | Facility: CLINIC | Age: 48
End: 2023-08-25
Attending: INTERNAL MEDICINE
Payer: COMMERCIAL

## 2023-08-25 ENCOUNTER — ONCOLOGY VISIT (OUTPATIENT)
Dept: ONCOLOGY | Facility: CLINIC | Age: 48
End: 2023-08-25
Payer: COMMERCIAL

## 2023-08-25 VITALS
HEART RATE: 109 BPM | BODY MASS INDEX: 33.95 KG/M2 | WEIGHT: 222.7 LBS | TEMPERATURE: 98.8 F | OXYGEN SATURATION: 98 % | SYSTOLIC BLOOD PRESSURE: 103 MMHG | DIASTOLIC BLOOD PRESSURE: 56 MMHG | RESPIRATION RATE: 18 BRPM

## 2023-08-25 DIAGNOSIS — Z51.11 ENCOUNTER FOR ANTINEOPLASTIC CHEMOTHERAPY: ICD-10-CM

## 2023-08-25 DIAGNOSIS — C83.398 DIFFUSE LARGE B-CELL LYMPHOMA OF EXTRANODAL SITE EXCLUDING SPLEEN AND OTHER SOLID ORGANS: Primary | ICD-10-CM

## 2023-08-25 DIAGNOSIS — Z76.89 PREVENTION OF CHEMOTHERAPY-INDUCED NEUTROPENIA: ICD-10-CM

## 2023-08-25 LAB
ALBUMIN SERPL BCG-MCNC: 3.5 G/DL (ref 3.5–5.2)
ALP SERPL-CCNC: 83 U/L (ref 35–104)
ALT SERPL W P-5'-P-CCNC: 10 U/L (ref 0–50)
ANION GAP SERPL CALCULATED.3IONS-SCNC: 10 MMOL/L (ref 7–15)
APPEARANCE CSF: CLEAR
AST SERPL W P-5'-P-CCNC: 19 U/L (ref 0–45)
BILIRUB SERPL-MCNC: 0.4 MG/DL
BUN SERPL-MCNC: 10.5 MG/DL (ref 6–20)
CALCIUM SERPL-MCNC: 9.5 MG/DL (ref 8.6–10)
CHLORIDE SERPL-SCNC: 105 MMOL/L (ref 98–107)
COLOR CSF: YELLOW
CREAT SERPL-MCNC: 0.79 MG/DL (ref 0.51–0.95)
DEPRECATED HCO3 PLAS-SCNC: 27 MMOL/L (ref 22–29)
GFR SERPL CREATININE-BSD FRML MDRD: >90 ML/MIN/1.73M2
GLUCOSE SERPL-MCNC: 111 MG/DL (ref 70–99)
MAGNESIUM SERPL-MCNC: 2.3 MG/DL (ref 1.7–2.3)
PATH REPORT.COMMENTS IMP SPEC: NORMAL
PATH REPORT.FINAL DX SPEC: NORMAL
PATH REPORT.MICROSCOPIC SPEC OTHER STN: NORMAL
PATH REPORT.RELEVANT HX SPEC: NORMAL
PATH REV: ABNORMAL
PHOSPHATE SERPL-MCNC: 3.4 MG/DL (ref 2.5–4.5)
POTASSIUM SERPL-SCNC: 3.5 MMOL/L (ref 3.4–5.3)
PROT SERPL-MCNC: 6.4 G/DL (ref 6.4–8.3)
RBC # CSF MANUAL: 67 /UL (ref 0–2)
SODIUM SERPL-SCNC: 142 MMOL/L (ref 136–145)
TUBE # CSF: 1
URATE SERPL-MCNC: 2.9 MG/DL (ref 2.4–5.7)
WBC # CSF MANUAL: 1 /UL (ref 0–5)

## 2023-08-25 PROCEDURE — 36415 COLL VENOUS BLD VENIPUNCTURE: CPT | Performed by: INTERNAL MEDICINE

## 2023-08-25 PROCEDURE — 96417 CHEMO IV INFUS EACH ADDL SEQ: CPT

## 2023-08-25 PROCEDURE — 96375 TX/PRO/DX INJ NEW DRUG ADDON: CPT

## 2023-08-25 PROCEDURE — 96377 APPLICATON ON-BODY INJECTOR: CPT | Mod: 59

## 2023-08-25 PROCEDURE — 250N000013 HC RX MED GY IP 250 OP 250 PS 637: Performed by: INTERNAL MEDICINE

## 2023-08-25 PROCEDURE — 96411 CHEMO IV PUSH ADDL DRUG: CPT

## 2023-08-25 PROCEDURE — 99215 OFFICE O/P EST HI 40 MIN: CPT

## 2023-08-25 PROCEDURE — 84100 ASSAY OF PHOSPHORUS: CPT

## 2023-08-25 PROCEDURE — 96372 THER/PROPH/DIAG INJ SC/IM: CPT | Performed by: INTERNAL MEDICINE

## 2023-08-25 PROCEDURE — 83735 ASSAY OF MAGNESIUM: CPT

## 2023-08-25 PROCEDURE — 999N000248 HC STATISTIC IV INSERT WITH US BY RN

## 2023-08-25 PROCEDURE — 96367 TX/PROPH/DG ADDL SEQ IV INF: CPT

## 2023-08-25 PROCEDURE — 258N000003 HC RX IP 258 OP 636: Performed by: INTERNAL MEDICINE

## 2023-08-25 PROCEDURE — 80053 COMPREHEN METABOLIC PANEL: CPT | Performed by: INTERNAL MEDICINE

## 2023-08-25 PROCEDURE — 84550 ASSAY OF BLOOD/URIC ACID: CPT

## 2023-08-25 PROCEDURE — 96413 CHEMO IV INFUSION 1 HR: CPT

## 2023-08-25 PROCEDURE — 250N000011 HC RX IP 250 OP 636: Performed by: INTERNAL MEDICINE

## 2023-08-25 PROCEDURE — 96415 CHEMO IV INFUSION ADDL HR: CPT

## 2023-08-25 RX ORDER — EPINEPHRINE 1 MG/ML
0.3 INJECTION, SOLUTION INTRAMUSCULAR; SUBCUTANEOUS EVERY 5 MIN PRN
Status: DISCONTINUED | OUTPATIENT
Start: 2023-08-25 | End: 2023-08-25 | Stop reason: HOSPADM

## 2023-08-25 RX ORDER — ACETAMINOPHEN 325 MG/1
650 TABLET ORAL ONCE
Status: COMPLETED | OUTPATIENT
Start: 2023-08-25 | End: 2023-08-25

## 2023-08-25 RX ORDER — METHYLPREDNISOLONE SODIUM SUCCINATE 125 MG/2ML
125 INJECTION, POWDER, LYOPHILIZED, FOR SOLUTION INTRAMUSCULAR; INTRAVENOUS
Status: DISCONTINUED | OUTPATIENT
Start: 2023-08-25 | End: 2023-08-25 | Stop reason: HOSPADM

## 2023-08-25 RX ORDER — ALBUTEROL SULFATE 0.83 MG/ML
2.5 SOLUTION RESPIRATORY (INHALATION)
Status: DISCONTINUED | OUTPATIENT
Start: 2023-08-25 | End: 2023-08-25 | Stop reason: HOSPADM

## 2023-08-25 RX ORDER — DIPHENHYDRAMINE HYDROCHLORIDE 50 MG/ML
50 INJECTION INTRAMUSCULAR; INTRAVENOUS
Status: COMPLETED | OUTPATIENT
Start: 2023-08-25 | End: 2023-08-25

## 2023-08-25 RX ORDER — ALBUTEROL SULFATE 90 UG/1
1-2 AEROSOL, METERED RESPIRATORY (INHALATION)
Status: DISCONTINUED | OUTPATIENT
Start: 2023-08-25 | End: 2023-08-25 | Stop reason: HOSPADM

## 2023-08-25 RX ORDER — MEPERIDINE HYDROCHLORIDE 25 MG/ML
25 INJECTION INTRAMUSCULAR; INTRAVENOUS; SUBCUTANEOUS EVERY 30 MIN PRN
Status: DISCONTINUED | OUTPATIENT
Start: 2023-08-25 | End: 2023-08-25 | Stop reason: HOSPADM

## 2023-08-25 RX ORDER — DOXORUBICIN HYDROCHLORIDE 2 MG/ML
50 INJECTION, SOLUTION INTRAVENOUS ONCE
Status: COMPLETED | OUTPATIENT
Start: 2023-08-25 | End: 2023-08-25

## 2023-08-25 RX ORDER — DIPHENHYDRAMINE HCL 25 MG
50 CAPSULE ORAL ONCE
Status: COMPLETED | OUTPATIENT
Start: 2023-08-25 | End: 2023-08-25

## 2023-08-25 RX ORDER — PALONOSETRON 0.05 MG/ML
0.25 INJECTION, SOLUTION INTRAVENOUS ONCE
Status: COMPLETED | OUTPATIENT
Start: 2023-08-25 | End: 2023-08-25

## 2023-08-25 RX ADMIN — FOSAPREPITANT DIMEGLUMINE 150 MG: 150 INJECTION, POWDER, LYOPHILIZED, FOR SOLUTION INTRAVENOUS at 08:52

## 2023-08-25 RX ADMIN — FAMOTIDINE 20 MG: 10 INJECTION INTRAVENOUS at 12:59

## 2023-08-25 RX ADMIN — SODIUM CHLORIDE 500 ML: 9 INJECTION, SOLUTION INTRAVENOUS at 12:53

## 2023-08-25 RX ADMIN — DIPHENHYDRAMINE HYDROCHLORIDE 50 MG: 50 INJECTION INTRAMUSCULAR; INTRAVENOUS at 12:53

## 2023-08-25 RX ADMIN — DOXORUBICIN HYDROCHLORIDE 110 MG: 2 INJECTION, SOLUTION INTRAVENOUS at 13:46

## 2023-08-25 RX ADMIN — MEPERIDINE HYDROCHLORIDE 25 MG: 25 INJECTION INTRAMUSCULAR; INTRAVENOUS; SUBCUTANEOUS at 12:56

## 2023-08-25 RX ADMIN — SODIUM CHLORIDE 250 ML: 9 INJECTION, SOLUTION INTRAVENOUS at 08:49

## 2023-08-25 RX ADMIN — DIPHENHYDRAMINE HYDROCHLORIDE 50 MG: 25 CAPSULE ORAL at 08:24

## 2023-08-25 RX ADMIN — POLATUZUMAB VEDOTIN 170 MG: 140 INJECTION, POWDER, LYOPHILIZED, FOR SOLUTION INTRAVENOUS at 09:29

## 2023-08-25 RX ADMIN — METHYLPREDNISOLONE SODIUM SUCCINATE 125 MG: 125 INJECTION, POWDER, FOR SOLUTION INTRAMUSCULAR; INTRAVENOUS at 12:53

## 2023-08-25 RX ADMIN — PALONOSETRON HYDROCHLORIDE 0.25 MG: 0.25 INJECTION INTRAVENOUS at 08:48

## 2023-08-25 RX ADMIN — CYCLOPHOSPHAMIDE 1500 MG: 1 INJECTION, POWDER, FOR SOLUTION INTRAVENOUS; ORAL at 13:59

## 2023-08-25 RX ADMIN — RITUXIMAB-ABBS 800 MG: 10 INJECTION, SOLUTION INTRAVENOUS at 11:34

## 2023-08-25 RX ADMIN — ACETAMINOPHEN 650 MG: 325 TABLET ORAL at 08:24

## 2023-08-25 RX ADMIN — PEGFILGRASTIM 6 MG: KIT SUBCUTANEOUS at 15:22

## 2023-08-25 NOTE — LETTER
8/25/2023         RE: Rachael Johnson  [address] [place] [address]        Dear Colleague,    Thank you for referring your patient, Rachael Johnson, to the [facility]. Please see a copy of my visit note below.                                     [facility]               NEW PATIENT REFERRAL        Aug 25, 2023   Subjective  REFERRAL SOURCE: Bethel [place] MD    REASON FOR VISIT: New DLBCL    HISTORY OF PRESENT ILLNESS:  Ms. Rachael Johnson is a 48 year old female who presents for consultation regarding newly diagnosed DLBCL.     She originally developed left lower back pain radiated down the left leg with associated numbness/tingling in May 2023. Was managed conservatively with PT and pain medications without improvement. She eventually had a MRI 7/14/23 which revealed a 9 x 10 x 11 cm mass involving left sacrum and left iliac bone and soft tissues, with encasement of left S1 nerve root. CT showed additional soft tissue nodules in left flank and left abdomen and left external iliac LAD. She saw Ortho and biopsy of the soft tissue mass on 7/27/23 shows DLBCL (GCB subtype) with Ki-67 of 70%. FISH negative for MYC/BCL2/BCL6 rearrangements.     She presents to clinic for follow-up prior to C1D1.  She is overall stable.  She continues with the low left back/gluteal pain. Pain is most noticeable when sitting and sleeping and she has had progressive difficulty walking.   She is taking her Oxycodone as needed, been using about once a day or every other day at this time.  She continues with a numbness/tingling that starts in the L glute and radiates down the entire leg into the foot- reports this does impact her walking and she uses a cane.      She reports her appetite has been decreased but she is making sure to eat at least 2 meals per day. She does have some intermittent constipation from the pain medication but she eats prunes to manage.  Denies fevers/chills, SOB/cough,  dental pain, abd pain, N/V/D, bleeding issues, neuropathy in upper extremities, new pains, new lumps/bumps, chest pain/palpitations, edema.     ONCOLOGIC SUMMARY:  Diagnosis:  Diffuse large B-cell lymphoma dx 7/2023, presenting with left back pain and radiculopathy 2/2 large 9 x 10 x 11 cm sacral mass encasing lumbosacral nerve roots. Germinal-center subtype, FISH negative for MYC/BCL2/BCL6 rearrangements, Ki67 70%. Stage IV with involvement of bone, pelvic nodules, and lymphadenopathy above/below diaphragm. . R-IPI 3 (LDH, stage, extranodal sites).     Intent of treatment: Curative    Treatment history:  Mick-R-CHP pending    PAST MEDICAL HISTORY:  Ovarian cyst  Reported history of spina bifada    FAMILY HISTORY:  No cancer in family history.  Maternal grandmother passed away at age 33 possibly due to pregnancy/delivery complications    SOCIAL HISTORY:  Never smoker.   Lives in [place], [place] by herself.  after  passed away from kidney failure. Originally from [place]. No family nearby, sister lives in [facility].   Works as a massage therapist.       Allergies   Allergen Reactions    Seasonal Allergies Other (See Comments)     Itchy, watery eyes, runny nose and congestion   Itchy, watery eyes, runny nose and congestion       Current Outpatient Medications:     acyclovir (ZOVIRAX) 400 MG tablet, Take 1 tablet (400 mg) by mouth 2 times daily Viral Prophylaxis. (Patient not taking: Reported on 8/24/2023), Disp: 60 tablet, Rfl: 5    allopurinol (ZYLOPRIM) 300 MG tablet, Take 1 tablet (300 mg) by mouth daily for 30 days (Patient not taking: Reported on 8/24/2023), Disp: 30 tablet, Rfl: 0    cholecalciferol (VITAMIN D3) 25 mcg (1000 units) capsule, Take 1,000 Units by mouth, Disp: , Rfl:     cyclobenzaprine (FLEXERIL) 5 MG tablet, Take 1 tablet (5 mg) by mouth 3 times daily as needed for muscle spasms, Disp: 20 tablet, Rfl: 0    loratadine (CLARITIN) 10 MG tablet, Take 10 mg by mouth, Disp: , Rfl:      ondansetron (ZOFRAN) 8 MG tablet, Take 1 tablet (8 mg) by mouth every 8 hours as needed for nausea (vomiting) (Patient not taking: Reported on 8/24/2023), Disp: 30 tablet, Rfl: 2    oxyCODONE (ROXICODONE) 5 MG tablet, Take 1 tablet (5 mg) by mouth every 6 hours as needed for moderate to severe pain (Patient not taking: Reported on 8/24/2023), Disp: 30 tablet, Rfl: 0    predniSONE (DELTASONE) 50 MG tablet, Take 1 tablet (50 mg) by mouth 2 times daily for 5 days Take on Days 1 through 5. Take first dose in AM prior to chemotherapy., Disp: 10 tablet, Rfl: 5    prochlorperazine (COMPAZINE) 10 MG tablet, Take 1 tablet (10 mg) by mouth every 6 hours as needed for nausea or vomiting (Patient not taking: Reported on 8/24/2023), Disp: 30 tablet, Rfl: 2    sulfamethoxazole-trimethoprim (BACTRIM DS) 800-160 MG tablet, Take 1 tablet by mouth 2 times daily On Mondays and Tuesdays, Disp: 16 tablet, Rfl: 5    ZINC METHIONATE PO, Take  by mouth., Disp: , Rfl:     zinc sulfate (ZINCATE) 220 (50 Zn) MG capsule, by mouth., Disp: , Rfl:   No current facility-administered medications for this visit.    Facility-Administered Medications Ordered in Other Visits:     cycloPHOSphamide (CYTOXAN) 1,660 mg in sodium chloride 0.9 % 583 mL infusion, 750 mg/m2 (Treatment Plan Recorded), Intravenous, Once, Kalpana Caicedo MD    DOXOrubicin (ADRIAMYCIN) injection 110 mg, 50 mg/m2 (Treatment Plan Recorded), Intravenous Push, Once, Kalpana Caicedo MD    fosaprepitant (EMEND) 150 mg in sodium chloride 0.9 % 275 mL intermittent infusion, 150 mg, Intravenous, Once, Kalpana Caicedo MD, Last Rate: 825 mL/hr at 08/25/23 0852, 150 mg at 08/25/23 0852    pegfilgrastim (NEULASTA Onpro Kit) On-body injector 6 mg, 6 mg, Subcutaneous, Once, Kalpana Caicedo MD    polatuzumab vedotin-piiq (POLIVY) 170 mg in D5W 118.5 mL intermittent infusion, 170 mg, Intravenous, Once, Kalpana Caicedo MD    riTUXimab-abbs (TRUXIMA) 800 mg in sodium chloride 0.9 % 800 mL infusion, 375 mg/m2 (Treatment Plan  Recorded), Intravenous, Once, Kalpana Caicedo MD     REVIEW OF SYSTEMS:  12-point ROS reviewed and negative other than that mentioned in HPI.     Objective  VITAL SIGNS:  There were no vitals taken for this visit. (Done in infusion center)    ECOG PS: 1     PHYSICAL EXAM:  General: No acute distress, AO x3  HEENT: Sclera anicteric. Oral exam deferred  Lymph: No lymphadenopathy in neck  Heart: Regular, rate, and rhythm  Lungs: Clear to ascultation bilaterally  Abdomen: Positive bowel sounds.   Extremities: no lower extremity edema  Neuro: Cranial nerves grossly intact  Rash: none on exposed skin     LABS:  I reviewed the following labs:  Lab Results   Component Value Date    WBC 5.7 08/24/2023    HGB 11.1 (L) 08/24/2023    HCT 35.0 08/24/2023    MCV 90 08/24/2023     08/24/2023     Lab Results   Component Value Date     08/10/2023    POTASSIUM 3.5 08/10/2023    CR 0.83 08/10/2023    BUN 16.8 08/10/2023    CHLORIDE 106 08/10/2023    MONTEZ 9.9 08/10/2023    GLC 78 08/11/2023      Lab Results   Component Value Date    ALT 11 08/10/2023    AST 23 08/10/2023    ALKPHOS 106 (H) 08/10/2023    BILITOTAL 0.3 08/10/2023      Lab Results   Component Value Date     (H) 08/10/2023    URIC 5.8 (H) 08/10/2023       Assessment & Plan  #Stage IV bulky DLBCL (GCB subtype)   #Back pain/radiculopathy 2/2 large sacral mass  Pleasant 48 year old female with newly diagnosed DLBCL, stage IV with involvement of multiple bone sites including dominant 9 x 10 x 11 cm sacral mass, pelvic nodules, and lymphadenopathy above/below diaphragm. GCB subtype, FISH negative for rearrangements. R-IPI high-intermediate risk (3). She is quite symptomatic with back pain and sciatica due to encasement of lumbosacral nerves by the mass. 8/24 staging LP- flow  pending    She will receive first-line therapy with polatuzumab-R-CHP which was recently FDA approved based on the Phase 3 international POLARIX trial  (https://www.nejm.org/doi/full/10.1056/PYZMwv7317246).    - We will plan for a total of 6 cycles with restaging PET-CT every 2 cycles. She is aware that there may be a role for consolidative radiation given the bulky sacral mass.   - Plan to start the first cycle at the [facility] while we look for alternative areas that are closer to her home.   - May need [facility] for some stays depending on how early the appointments are.   - Proceed with C1D1 Mick-R-CHP today (8/25)   - Will be due for PET after C2 ~10/4    #Ppx  - TLS ppx: baseline uric acid 5.8. Start allopurinol 300 mg daily. Encouraged good hydration.  -  mg BID.    - Bactrim BID on Mondays and Tuesdays     #L low black and gluteal pain:  Patient reports ongoing low left back/gluteal pain that radiates down the entire leg into the feet.    - Discussed to stop taking ibuprofen and she can take Tylenol if needed  - Oxycodone 5 mg Q6 hours PRN  - Flexeril 5 mg TID PRN     PLAN:  - Proceed with C1D1 today (8/25)  - Virtual visit with Natalya 9/1 for toxicity check  - Patient reports her future visits will be at a local facility- per patient appt scheduled for 9/1    65 minutes spent on the date of the encounter doing chart review, review of test results, interpretation of tests, patient visit, and documentation     AMA Nickerson CNP

## 2023-08-25 NOTE — PATIENT INSTRUCTIONS
Neulasta injection will start tomorrow at 1830, approximately 27 hours after application today.  When the dose delivery starts, it will take about 45 minutes to complete.Neulasta Onpro On-Body should have green flashing light.  Call triage or on-call MD if injector flashes red or appears to be leaking.  Keep Onpro On-Body Neulasta 4 inches away from electrical equipment and avoid showering 4 hours prior to injection.       Contact Numbers  [facility]: [phone] (for symptom and scheduling needs)    Please call the [facility] Triage line if you experience a temperature greater than or equal to 100.4, shaking chills, have uncontrolled nausea, vomiting and/or diarrhea, dizziness, shortness of breath, chest pain, bleeding, unexplained bruising, or if you have any other new/concerning symptoms, questions or concerns.     If you are having any concerning symptoms or wish to speak to a provider before your next infusion visit, please call your care coordinator or triage to notify them so we can adequately serve you.     If you need a refill on a narcotic prescription or other medication, please call triage before your infusion appointment.          Lab Results:  Recent Results (from the past 12 hour(s))   Comprehensive metabolic panel    Collection Time: 08/25/23  8:48 AM   Result Value Ref Range    Sodium 142 136 - 145 mmol/L    Potassium 3.5 3.4 - 5.3 mmol/L    Chloride 105 98 - 107 mmol/L    Carbon Dioxide (CO2) 27 22 - 29 mmol/L    Anion Gap 10 7 - 15 mmol/L    Urea Nitrogen 10.5 6.0 - 20.0 mg/dL    Creatinine 0.79 0.51 - 0.95 mg/dL    Calcium 9.5 8.6 - 10.0 mg/dL    Glucose 111 (H) 70 - 99 mg/dL    Alkaline Phosphatase 83 35 - 104 U/L    AST 19 0 - 45 U/L    ALT 10 0 - 50 U/L    Protein Total 6.4 6.4 - 8.3 g/dL    Albumin 3.5 3.5 - 5.2 g/dL    Bilirubin Total 0.4 <=1.2 mg/dL    GFR Estimate >90 >60 mL/min/1.73m2   Phosphorus    Collection Time: 08/25/23  8:48 AM   Result Value Ref Range    Phosphorus 3.4 2.5 -  4.5 mg/dL   Magnesium    Collection Time: 08/25/23  8:48 AM   Result Value Ref Range    Magnesium 2.3 1.7 - 2.3 mg/dL   Uric acid    Collection Time: 08/25/23  8:48 AM   Result Value Ref Range    Uric Acid 2.9 2.4 - 5.7 mg/dL

## 2023-08-25 NOTE — PROGRESS NOTES
Infusion Nursing Note:  Rachael Johnson presents today for C1D1 polatuzumab vedotin-piiq, rituximab-abbs, Adriamycin, Cytoxan, and Neulasta Onpro.    Patient seen by provider today: Yes: Natalya Rhodes NP   present during visit today: Not Applicable.    Note: Rachael presents to infusion today in 5/10 pain. She declines any interventions in infusion other than requesting a bed so she is able to lay down. She denies any other concerns.    Patient new to oncology infusion room and is receiving everything for the first time. Pt oriented to infusion room, including chair, nutrition station and call light. New patient teaching done previously by ISRAEL Butterfield. Pt received new pt folder prior to coming to infusion. Writer reinforced chemotherapy teaching/side effects and schedule.     Pt instructed to call care coordinator, triage (or MD on call if after hours/weekends) with chills/temp >=100.5, questions/concerns. Pt stated understanding of plan.     TIMMY Caicedo/Beth Bailon RN:  -OK to only do 30 minute observation following 1st dose of polatuzumab today due to time constraints  -Still do Rituxan prior to Adriamycin & Cytoxan    Writer went to help patient back to her seat after using the restroom, and she stated she was having some chills, and patient began to rigor. Infusion stopped, vital signs taken, hypersensitivity meds given (see below). Patient initially stated her throat was feeling itchy. After medications were administered, patient stated her throat felt better, but she was feeling dizzy now. Rigors resolved. Notified Dr. Caicedo and Natalya Rhodes NP.    Due to time constraints of infusion center, restarting Rituxan at half the rate once all symptoms are gone would not allow enough time for Adriamycin and Cytoxan to be administered today.     TIMMY Rhodes NP/Beth Bailon RN:  -Consulted with Dr. Caicedo. Since patient can't come back tomorrow (weekend), give Adriamycin then Cytoxan then finish  what you can of Rituxan today prior to closing.    Rituxan restarted at half the rate (75 ml/hr) and titrated up by 50 ml/hr every 30 minutes until 1710. Patient tolerated this without incident. Maximum rate of infusion was 275 ml/hr. Patient had approximately 250 ml remaining in rituxan bag at time of discharge.      Intravenous Access:  Peripheral IV placed.    Treatment Conditions:  Lab Results   Component Value Date    HGB 11.1 (L) 08/24/2023    WBC 5.7 08/24/2023    ANEUTAUTO 4.1 08/24/2023     08/24/2023        Lab Results   Component Value Date     08/25/2023    POTASSIUM 3.5 08/25/2023    MAG 2.3 08/25/2023    CR 0.79 08/25/2023    MONTEZ 9.5 08/25/2023    BILITOTAL 0.4 08/25/2023    ALBUMIN 3.5 08/25/2023    ALT 10 08/25/2023    AST 19 08/25/2023       Results reviewed, labs MET treatment parameters, ok to proceed with treatment.    Post Infusion Assessment:    Patient tolerated infusion poorly due to : Hypersensitivity: Did patient have a hypersensitivity reaction? : Yes  Drug or Product name: Rituximab-abbs  Were pre-meds administered?: Yes  What pre-meds were administered?: Acetaminophen (Tylenol);Diphenhydramine (Benadryl);Palonesetron (aloxi);Fosaprepitant (emend);Prednisone  First or Subsequent treatment: First time receiving  Rate of infusion when patient had hypersensitivity reaction: 150  Time the hypersensitivity reaction was first recognized: 1251  Symptoms observed or reported (select all that apply): Rigors;Itching;Chills;Other: (Comment) (itchy throat, dizziness)  Interventions/treatment following reaction: Infusion stopped;Hypersensitivity medications administered;Reduced rate of medication administration  What hypersensitivity medications were administered?: DiphendydrAMINE (benadryl);Meperidine (Demerol);Methylprednisolone;NaCl 0.9% Bolus;Famotidine(Pepcid)  Name of provider notified: Dr. Caicedo  Time provider notified: 2805  Type of notification (select all that apply):  Paged/Phone  Was the patient re-challenged today?: Yes - tolerated well    Blood return noted pre and post infusion.  Site patent and intact, free from redness, edema or discomfort.  No evidence of extravasations.  Access discontinued per protocol.     Neulasta Onpro On-Body injector applied to RLQ abdomen at 1526.  Writer discussed Neulasta injection would start tomorrow 8/26 at 1826, approximately 27 hours after application applied today.    Written and Verbal instruction reviewed with patient.  Pt instructed when the dose delivery starts, it will take about 45 minutes to complete.  Pt aware Neulasta Onpro On-Body should have green flashing light and to call triage or on-call MD if injector flashes red or appears to be leaking.   Pt aware to keep Onpro On-Body Neulasta 4 inches away from electrical equipment and to avoid showering 4 hours prior to injection.   Neulasta Onpro Lot number: [id number]     Discharge Plan:   Prescription refills given for bactrim & prednisone. Patient already picked up other take-home meds.  Discharge instructions reviewed with: Patient.  Patient and/or family verbalized understanding of discharge instructions and all questions answered.  Copy of AVS reviewed with patient and/or family.  Patient will return 9/1 for next appointment with Natalya. Her next infusion will be done closer to home, and she states she has those appointments set up already.  Patient discharged in stable condition accompanied by: self.  Departure Mode: Wheelchair.      Beth Bailon RN

## 2023-08-28 ENCOUNTER — MYC MEDICAL ADVICE (OUTPATIENT)
Dept: ONCOLOGY | Facility: CLINIC | Age: 48
End: 2023-08-28
Payer: COMMERCIAL

## 2023-08-28 ENCOUNTER — PATIENT OUTREACH (OUTPATIENT)
Dept: ONCOLOGY | Facility: CLINIC | Age: 48
End: 2023-08-28
Payer: COMMERCIAL

## 2023-08-28 NOTE — PROGRESS NOTES
[facility]: Cancer Care                                                                                          Rachael states that she is feeling okay. She thinks her pain is doing better, but its not gone, and she says that she is sleeping good. She did report some constipation, she had a bowel movement this morning but did not have one for 4 days. Pt has used Miralax in the past, recommended she can continue to use it if needed for constipation.   She is driving home to [facility] later today. Bethel any other questions or concerns at this time, states she will call with new symptoms or concerns.    Has an appointment Friday with an oncologist with [facility] in [place].     Jie Molina, RN, BSN  RN Care Coordinator   [address] [place] [id number] [phone]

## 2023-08-29 DIAGNOSIS — C83.398 DIFFUSE LARGE B-CELL LYMPHOMA OF EXTRANODAL SITE EXCLUDING SPLEEN AND OTHER SOLID ORGANS: Primary | ICD-10-CM

## 2023-08-29 NOTE — TELEPHONE ENCOUNTER
Order for labs faxed to [facility] in [place].           Jie Molina, RN, BSN  RN Care Coordinator   [address] [place] [id number] [phone]

## 2023-08-31 NOTE — PROGRESS NOTES
Virtual Visit Details    Type of service:  Video Visit   Video Start Time: 9:16 AM  Video End Time:9:24 AM    Originating Location (pt. Location): Home    Distant Location (provider location):  On-site  Platform used for Video Visit: [facility]               NEW PATIENT REFERRAL        Sep 1, 2023   Subjective   REFERRAL SOURCE: Bethel Estrada MD    REASON FOR VISIT: New DLBCL    HISTORY OF PRESENT ILLNESS:  Ms. Rachael Johnson is a 48 year old female who presents for consultation regarding newly diagnosed DLBCL.     She originally developed left lower back pain radiated down the left leg with associated numbness/tingling in May 2023. Was managed conservatively with PT and pain medications without improvement. She eventually had a MRI 7/14/23 which revealed a 9 x 10 x 11 cm mass involving left sacrum and left iliac bone and soft tissues, with encasement of left S1 nerve root. CT showed additional soft tissue nodules in left flank and left abdomen and left external iliac LAD. She saw Ortho and biopsy of the soft tissue mass on 7/27/23 shows DLBCL (GCB subtype) with Ki-67 of 70%. FISH negative for MYC/BCL2/BCL6 rearrangements.     She presents to clinic for toxicity check following C1D1 Mick-R-CHP on 8/25.  She is overall doing well.  Her energy levels have been good.  The first couple days after chemo she was fatigued but this has improved.  She has noticed a low appetite and dry mouth- reports she is still eating well.      Since chemotherapy her low left back/gluteal pain has improved slightly.  Pain is most noticeable when sitting and sleeping and she has had progressive difficulty walking- she uses a cane.   Since chemotherapy she has only needed her  Oxycodone twice and is sleeping better.   Continues with a numbness/tingling that starts in the L glute and radiates down the entire leg into the foot- reports this does impact her walking and she uses a cane-  feels this has slightly improved since chemotherapy.     She does reports new pain in her chest in the esophagus area.  Feels like a tightness and happened last night when lying down and little bit this morning.     Denies fevers/chills, SOB/cough, dental pain, abd pain, N/V/D/C, bleeding issues, neuropathy in upper extremities, new pains, new lumps/bumps, edema.     ONCOLOGIC SUMMARY:  Diagnosis:  Diffuse large B-cell lymphoma dx 7/2023, presenting with left back pain and radiculopathy 2/2 large 9 x 10 x 11 cm sacral mass encasing lumbosacral nerve roots. Germinal-center subtype, FISH negative for MYC/BCL2/BCL6 rearrangements, Ki67 70%. Stage IV with involvement of bone, pelvic nodules, and lymphadenopathy above/below diaphragm. . R-IPI 3 (LDH, stage, extranodal sites).     Intent of treatment: Curative    Treatment history:  Mick-R-CHP pending    PAST MEDICAL HISTORY:  Ovarian cyst  Reported history of spina bifada    FAMILY HISTORY:  No cancer in family history.  Maternal grandmother passed away at age 33 possibly due to pregnancy/delivery complications    SOCIAL HISTORY:  Never smoker.   Lives in [place], [place] by herself.  after  passed away from kidney failure. Originally from [place]. No family nearby, sister lives in [facility].   Works as a massage therapist.       Allergies   Allergen Reactions    Seasonal Allergies Other (See Comments)     Itchy, watery eyes, runny nose and congestion   Itchy, watery eyes, runny nose and congestion       Current Outpatient Medications:     acyclovir (ZOVIRAX) 400 MG tablet, Take 1 tablet (400 mg) by mouth 2 times daily Viral Prophylaxis. (Patient not taking: Reported on 8/24/2023), Disp: 60 tablet, Rfl: 5    allopurinol (ZYLOPRIM) 300 MG tablet, Take 1 tablet (300 mg) by mouth daily for 30 days (Patient not taking: Reported on 8/24/2023), Disp: 30 tablet, Rfl: 0    cholecalciferol (VITAMIN D3) 25 mcg (1000 units) capsule, Take 1,000 Units by mouth, Disp:  , Rfl:     cyclobenzaprine (FLEXERIL) 5 MG tablet, Take 1 tablet (5 mg) by mouth 3 times daily as needed for muscle spasms, Disp: 20 tablet, Rfl: 0    loratadine (CLARITIN) 10 MG tablet, Take 10 mg by mouth, Disp: , Rfl:     ondansetron (ZOFRAN) 8 MG tablet, Take 1 tablet (8 mg) by mouth every 8 hours as needed for nausea (vomiting) (Patient not taking: Reported on 8/24/2023), Disp: 30 tablet, Rfl: 2    oxyCODONE (ROXICODONE) 5 MG tablet, Take 1 tablet (5 mg) by mouth every 6 hours as needed for moderate to severe pain (Patient not taking: Reported on 8/24/2023), Disp: 30 tablet, Rfl: 0    prochlorperazine (COMPAZINE) 10 MG tablet, Take 1 tablet (10 mg) by mouth every 6 hours as needed for nausea or vomiting (Patient not taking: Reported on 8/24/2023), Disp: 30 tablet, Rfl: 2    sulfamethoxazole-trimethoprim (BACTRIM DS) 800-160 MG tablet, Take 1 tablet by mouth 2 times daily On Mondays and Tuesdays, Disp: 16 tablet, Rfl: 5    ZINC METHIONATE PO, Take  by mouth., Disp: , Rfl:     zinc sulfate (ZINCATE) 220 (50 Zn) MG capsule, by mouth., Disp: , Rfl:      REVIEW OF SYSTEMS:  12-point ROS reviewed and negative other than that mentioned in HPI.     Objective   VITAL SIGNS:  There were no vitals taken for this visit. (Done in infusion center)    ECOG PS: 1     PHYSICAL EXAM:  General: patient appears well in no acute distress, alert and oriented, speech clear and fluid  Skin: no visualized rash or lesions on visualized skin  Resp: Appears to be breathing comfortably without accessory muscle usage, speaking in full sentences, no audible wheezes or cough.  Psych: Coherent speech, normal rate and volume, able to articulate logical thoughts, able to abstract reason, no tangential thoughts, no hallucinations or delusions  Patient's affect is appropriate.    LABS:  I reviewed the following labs:  Lab Results   Component Value Date    WBC 5.7 08/24/2023    HGB 11.1 (L) 08/24/2023    HCT 35.0 08/24/2023    MCV 90 08/24/2023      08/24/2023     Lab Results   Component Value Date     08/25/2023    POTASSIUM 3.5 08/25/2023    CR 0.79 08/25/2023    BUN 10.5 08/25/2023    CHLORIDE 105 08/25/2023    MONTEZ 9.5 08/25/2023     (H) 08/25/2023      Lab Results   Component Value Date    ALT 10 08/25/2023    AST 19 08/25/2023    ALKPHOS 83 08/25/2023    BILITOTAL 0.4 08/25/2023      Lab Results   Component Value Date     (H) 08/10/2023    URIC 2.9 08/25/2023       Assessment & Plan   #Stage IV bulky DLBCL (GCB subtype)   #Back pain/radiculopathy 2/2 large sacral mass  Pleasant 48 year old female with newly diagnosed DLBCL, stage IV with involvement of multiple bone sites including dominant 9 x 10 x 11 cm sacral mass, pelvic nodules, and lymphadenopathy above/below diaphragm. GCB subtype, FISH negative for rearrangements. R-IPI high-intermediate risk (3). She is quite symptomatic with back pain and sciatica due to encasement of lumbosacral nerves by the mass. 8/24 staging LP- rare to absent B cells  She will receive first-line therapy with polatuzumab-R-CHP which was recently FDA approved based on the Phase 3 international POLARIX trial (https://www.nejm.org/doi/full/10.1056/MZZFqe1428334).    - Plan for a total of 6 cycles with restaging PET-CT every 2 cycles. She is aware that there may be a role for consolidative radiation given the bulky sacral mass.   - First cycle at the [facility] while we look for alternative areas that are closer to her home.   - C1D1 Mick-R-CHP 8/25   - Will be following with local oncologist for remainder of treatment    #Ppx  - TLS ppx: baseline uric acid 5.8. Start allopurinol 300 mg daily. Encouraged good hydration.  -  mg BID.    - Bactrim BID on Mondays and Tuesdays     #L low black and gluteal pain:  Patient reports ongoing low left back/gluteal pain that radiates down the entire leg into the feet.    - Discussed to stop taking ibuprofen and she can take Tylenol if needed  - Oxycodone 5 mg Q6  hours PRN  - Flexeril 5 mg TID PRN   - Since receiving first cycle of chemotherapy her pain and neuropathy have improved slightly.    PLAN:  - Patient reports her future visits will be with a local oncologist Dr. Lawrence Elizabeth in [place], [place].  She has a consult with him today (9/1) at 12:30  - Dr. Caicedo will keep in contact with her local oncologist.      20 minutes spent on the date of the encounter doing chart review, review of test results, interpretation of tests, patient visit, and documentation     AMA Nickerson CNP

## 2023-09-01 ENCOUNTER — VIRTUAL VISIT (OUTPATIENT)
Dept: ONCOLOGY | Facility: CLINIC | Age: 48
End: 2023-09-01
Payer: COMMERCIAL

## 2023-09-01 DIAGNOSIS — C83.398 DIFFUSE LARGE B-CELL LYMPHOMA OF EXTRANODAL SITE EXCLUDING SPLEEN AND OTHER SOLID ORGANS: Primary | ICD-10-CM

## 2023-09-01 PROCEDURE — 99213 OFFICE O/P EST LOW 20 MIN: CPT | Mod: VID

## 2023-09-01 NOTE — NURSING NOTE
Is the patient currently in the state of [place]? YES    Visit mode:VIDEO    If the visit is dropped, the patient can be reconnected by: VIDEO VISIT: Text to cell phone:   Telephone Information:   Mobile [phone]       Will anyone else be joining the visit? NO  (If patient encounters technical issues they should call [phone])    How would you like to obtain your AVS? MyChart    Are changes needed to the allergy or medication list? No    Reason for visit: No chief complaint on file.    Ronni DARLING

## 2023-09-01 NOTE — LETTER
9/1/2023         RE: Rachael Johnson  [address] [place] [address]        Dear Colleague,    Thank you for referring your patient, Rachael Johnson, to the [facility]. Please see a copy of my visit note below.    Virtual Visit Details    Type of service:  Video Visit   Video Start Time: 9:16 AM  Video End Time:9:24 AM    Originating Location (pt. Location): Home    Distant Location (provider location):  On-site  Platform used for Video Visit: [facility]               NEW PATIENT REFERRAL        Sep 1, 2023   Subjective  REFERRAL SOURCE: Bethel Estrada MD    REASON FOR VISIT: New DLBCL    HISTORY OF PRESENT ILLNESS:  Ms. Rachael Johnson is a 48 year old female who presents for consultation regarding newly diagnosed DLBCL.     She originally developed left lower back pain radiated down the left leg with associated numbness/tingling in May 2023. Was managed conservatively with PT and pain medications without improvement. She eventually had a MRI 7/14/23 which revealed a 9 x 10 x 11 cm mass involving left sacrum and left iliac bone and soft tissues, with encasement of left S1 nerve root. CT showed additional soft tissue nodules in left flank and left abdomen and left external iliac LAD. She saw Ortho and biopsy of the soft tissue mass on 7/27/23 shows DLBCL (GCB subtype) with Ki-67 of 70%. FISH negative for MYC/BCL2/BCL6 rearrangements.     She presents to clinic for toxicity check following C1D1 Mick-R-CHP on 8/25.  She is overall doing well.  Her energy levels have been good.  The first couple days after chemo she was fatigued but this has improved.  She has noticed a low appetite and dry mouth- reports she is still eating well.      Since chemotherapy her low left back/gluteal pain has improved slightly.  Pain is most noticeable when sitting and sleeping and she has had progressive difficulty walking- she uses a cane.   Since  chemotherapy she has only needed her  Oxycodone twice and is sleeping better.   Continues with a numbness/tingling that starts in the L glute and radiates down the entire leg into the foot- reports this does impact her walking and she uses a cane- feels this has slightly improved since chemotherapy.     She does reports new pain in her chest in the esophagus area.  Feels like a tightness and happened last night when lying down and little bit this morning.     Denies fevers/chills, SOB/cough, dental pain, abd pain, N/V/D/C, bleeding issues, neuropathy in upper extremities, new pains, new lumps/bumps, edema.     ONCOLOGIC SUMMARY:  Diagnosis:  Diffuse large B-cell lymphoma dx 7/2023, presenting with left back pain and radiculopathy 2/2 large 9 x 10 x 11 cm sacral mass encasing lumbosacral nerve roots. Germinal-center subtype, FISH negative for MYC/BCL2/BCL6 rearrangements, Ki67 70%. Stage IV with involvement of bone, pelvic nodules, and lymphadenopathy above/below diaphragm. . R-IPI 3 (LDH, stage, extranodal sites).     Intent of treatment: Curative    Treatment history:  Mick-R-CHP pending    PAST MEDICAL HISTORY:  Ovarian cyst  Reported history of spina bifada    FAMILY HISTORY:  No cancer in family history.  Maternal grandmother passed away at age 33 possibly due to pregnancy/delivery complications    SOCIAL HISTORY:  Never smoker.   Lives in [place], [place] by herself.  after  passed away from kidney failure. Originally from [place]. No family nearby, sister lives in [facility].   Works as a massage therapist.       Allergies   Allergen Reactions    Seasonal Allergies Other (See Comments)     Itchy, watery eyes, runny nose and congestion   Itchy, watery eyes, runny nose and congestion       Current Outpatient Medications:     acyclovir (ZOVIRAX) 400 MG tablet, Take 1 tablet (400 mg) by mouth 2 times daily Viral Prophylaxis. (Patient not taking: Reported on 8/24/2023), Disp: 60 tablet, Rfl: 5     allopurinol (ZYLOPRIM) 300 MG tablet, Take 1 tablet (300 mg) by mouth daily for 30 days (Patient not taking: Reported on 8/24/2023), Disp: 30 tablet, Rfl: 0    cholecalciferol (VITAMIN D3) 25 mcg (1000 units) capsule, Take 1,000 Units by mouth, Disp: , Rfl:     cyclobenzaprine (FLEXERIL) 5 MG tablet, Take 1 tablet (5 mg) by mouth 3 times daily as needed for muscle spasms, Disp: 20 tablet, Rfl: 0    loratadine (CLARITIN) 10 MG tablet, Take 10 mg by mouth, Disp: , Rfl:     ondansetron (ZOFRAN) 8 MG tablet, Take 1 tablet (8 mg) by mouth every 8 hours as needed for nausea (vomiting) (Patient not taking: Reported on 8/24/2023), Disp: 30 tablet, Rfl: 2    oxyCODONE (ROXICODONE) 5 MG tablet, Take 1 tablet (5 mg) by mouth every 6 hours as needed for moderate to severe pain (Patient not taking: Reported on 8/24/2023), Disp: 30 tablet, Rfl: 0    prochlorperazine (COMPAZINE) 10 MG tablet, Take 1 tablet (10 mg) by mouth every 6 hours as needed for nausea or vomiting (Patient not taking: Reported on 8/24/2023), Disp: 30 tablet, Rfl: 2    sulfamethoxazole-trimethoprim (BACTRIM DS) 800-160 MG tablet, Take 1 tablet by mouth 2 times daily On Mondays and Tuesdays, Disp: 16 tablet, Rfl: 5    ZINC METHIONATE PO, Take  by mouth., Disp: , Rfl:     zinc sulfate (ZINCATE) 220 (50 Zn) MG capsule, by mouth., Disp: , Rfl:      REVIEW OF SYSTEMS:  12-point ROS reviewed and negative other than that mentioned in HPI.     Objective  VITAL SIGNS:  There were no vitals taken for this visit. (Done in infusion center)    ECOG PS: 1     PHYSICAL EXAM:  General: patient appears well in no acute distress, alert and oriented, speech clear and fluid  Skin: no visualized rash or lesions on visualized skin  Resp: Appears to be breathing comfortably without accessory muscle usage, speaking in full sentences, no audible wheezes or cough.  Psych: Coherent speech, normal rate and volume, able to articulate logical thoughts, able to abstract reason, no  tangential thoughts, no hallucinations or delusions  Patient's affect is appropriate.    LABS:  I reviewed the following labs:  Lab Results   Component Value Date    WBC 5.7 08/24/2023    HGB 11.1 (L) 08/24/2023    HCT 35.0 08/24/2023    MCV 90 08/24/2023     08/24/2023     Lab Results   Component Value Date     08/25/2023    POTASSIUM 3.5 08/25/2023    CR 0.79 08/25/2023    BUN 10.5 08/25/2023    CHLORIDE 105 08/25/2023    MONTEZ 9.5 08/25/2023     (H) 08/25/2023      Lab Results   Component Value Date    ALT 10 08/25/2023    AST 19 08/25/2023    ALKPHOS 83 08/25/2023    BILITOTAL 0.4 08/25/2023      Lab Results   Component Value Date     (H) 08/10/2023    URIC 2.9 08/25/2023       Assessment & Plan  #Stage IV bulky DLBCL (GCB subtype)   #Back pain/radiculopathy 2/2 large sacral mass  Pleasant 48 year old female with newly diagnosed DLBCL, stage IV with involvement of multiple bone sites including dominant 9 x 10 x 11 cm sacral mass, pelvic nodules, and lymphadenopathy above/below diaphragm. GCB subtype, FISH negative for rearrangements. R-IPI high-intermediate risk (3). She is quite symptomatic with back pain and sciatica due to encasement of lumbosacral nerves by the mass. 8/24 staging LP- rare to absent B cells  She will receive first-line therapy with polatuzumab-R-CHP which was recently FDA approved based on the Phase 3 international POLARIX trial (https://www.nejm.org/doi/full/10.1056/MTANxy9767696).    - Plan for a total of 6 cycles with restaging PET-CT every 2 cycles. She is aware that there may be a role for consolidative radiation given the bulky sacral mass.   - First cycle at the [facility] while we look for alternative areas that are closer to her home.   - C1D1 Mick-R-CHP 8/25   - Will be following with local oncologist for remainder of treatment    #Ppx  - TLS ppx: baseline uric acid 5.8. Start allopurinol 300 mg daily. Encouraged good hydration.  -  mg BID.    - Bactrim BID  on Mondays and Tuesdays     #L low black and gluteal pain:  Patient reports ongoing low left back/gluteal pain that radiates down the entire leg into the feet.    - Discussed to stop taking ibuprofen and she can take Tylenol if needed  - Oxycodone 5 mg Q6 hours PRN  - Flexeril 5 mg TID PRN   - Since receiving first cycle of chemotherapy her pain and neuropathy have improved slightly.    PLAN:  - Patient reports her future visits will be with a local oncologist Dr. Lawrence Elizabeth in [place], [place].  She has a consult with him today (9/1) at 12:30  - Dr. Caicedo will keep in contact with her local oncologist.      20 minutes spent on the date of the encounter doing chart review, review of test results, interpretation of tests, patient visit, and documentation     AMA Nickerson CNP

## 2023-11-24 ENCOUNTER — PATIENT OUTREACH (OUTPATIENT)
Dept: ONCOLOGY | Facility: CLINIC | Age: 48
End: 2023-11-24
Payer: COMMERCIAL

## 2023-11-24 NOTE — PROGRESS NOTES
Situation: Patient chart reviewed by care coordinator.    Background: Received cycle 1 of Mick R CHP at [facility] on 8/25/23. Returned to [facility] to finish receiving chemotherapy there and follow with oncologist with [facility] [place] by [facility] as it is closer to her home.     Assessment: Has received treatment cycles 2-5 with [facility] [place].     Plan/Recommendations: Continue oncology care and treatment with [facility].     Jie Molina, RN, BSN  RN Care Coordinator   [address] [place] [id number] [phone]

## 2024-02-27 ENCOUNTER — PATIENT OUTREACH (OUTPATIENT)
Dept: ONCOLOGY | Facility: CLINIC | Age: 49
End: 2024-02-27
Payer: COMMERCIAL

## 2024-02-27 NOTE — PROGRESS NOTES
[facility]: Cancer Care Short Note                                    Discussion with Patient:                                                      INBOUND CALL: VM received from Tonie Cisneros NP from [place] who is seeing this patient for pain management. She is planning to do a caudal epidural and she/the patient want to clear it Dr. Caicedo. This has een oked by local oncologist at [facility].     Callback requested.    OUTBOUND CALL: RNCC called NP after reviewing with Dr. Caicedo. Per MD, she is not actively following this patient at this time-- would recommend clearing with local oncologist. Tonie Cisneros verbalizes understanding of recs.        Agueda Holland, RN, MSN, OCN   RN Care Coordinator   [facility] [address] [address] [phone]

## 2024-10-06 ENCOUNTER — HEALTH MAINTENANCE LETTER (OUTPATIENT)
Age: 49
End: 2024-10-06

## (undated) RX ORDER — LIDOCAINE HYDROCHLORIDE 10 MG/ML
INJECTION, SOLUTION INFILTRATION; PERINEURAL
Status: DISPENSED
Start: 2023-07-27